# Patient Record
Sex: FEMALE | Race: BLACK OR AFRICAN AMERICAN | URBAN - METROPOLITAN AREA
[De-identification: names, ages, dates, MRNs, and addresses within clinical notes are randomized per-mention and may not be internally consistent; named-entity substitution may affect disease eponyms.]

---

## 2017-05-17 ENCOUNTER — OFFICE VISIT (OUTPATIENT)
Dept: FAMILY MEDICINE CLINIC | Age: 32
End: 2017-05-17

## 2017-05-17 VITALS
HEIGHT: 62 IN | TEMPERATURE: 96.9 F | HEART RATE: 68 BPM | SYSTOLIC BLOOD PRESSURE: 126 MMHG | WEIGHT: 241 LBS | BODY MASS INDEX: 44.35 KG/M2 | RESPIRATION RATE: 18 BRPM | OXYGEN SATURATION: 100 % | DIASTOLIC BLOOD PRESSURE: 63 MMHG

## 2017-05-17 DIAGNOSIS — M76.61 ACHILLES TENDINITIS OF BOTH LOWER EXTREMITIES: Primary | ICD-10-CM

## 2017-05-17 DIAGNOSIS — L84 CALLUS OF FOOT: ICD-10-CM

## 2017-05-17 DIAGNOSIS — M76.62 ACHILLES TENDINITIS OF BOTH LOWER EXTREMITIES: Primary | ICD-10-CM

## 2017-05-17 RX ORDER — NAPROXEN 500 MG/1
500 TABLET ORAL 2 TIMES DAILY
Qty: 60 TAB | Refills: 0 | Status: SHIPPED | OUTPATIENT
Start: 2017-05-17 | End: 2017-06-13 | Stop reason: SDUPTHER

## 2017-05-17 RX ORDER — ARIPIPRAZOLE 20 MG/1
20 TABLET ORAL DAILY
Qty: 30 TAB | Refills: 0 | Status: CANCELLED | OUTPATIENT
Start: 2017-05-17

## 2017-05-17 RX ORDER — SERTRALINE HYDROCHLORIDE 100 MG/1
100 TABLET, FILM COATED ORAL DAILY
Qty: 30 TAB | Refills: 0 | Status: CANCELLED | OUTPATIENT
Start: 2017-05-17

## 2017-05-17 NOTE — PROGRESS NOTES
1. Have you been to the ER, urgent care clinic since your last visit? Hospitalized since your last visit? No    2. Have you seen or consulted any other health care providers outside of the [address] [place] [address] since your last visit? Include any pap smears or colon screening.  No

## 2017-05-17 NOTE — PROGRESS NOTES
Chief Complaint   Patient presents with    Foot Pain     some pain under the bottom of both feet as well. Pt is a 33y.o. year old female who presents for an acute visit for the above    Bilateral ankle pain-for a while now but recently worse; worse with walking, felt a knot on tendon on the right-gone now    Hurts on the lateral area-hard area    Has not taken any meds for the above sxs    Sees Psych in June-gets her rxs from them    ROS:    Pt denies: Wt loss, Fever/Chills, HA, Visual changes, Fatigue, Chest pain, SOB, TELLEZ, Abd pain, N/V/D/C, Blood in stool or urine, Edema. Pertinent positive as above in HPI. All others were negative    Patient Active Problem List   Diagnosis Code    Depression F32.9    Morbid obesity ([address] E66.01    Schizoaffective disorder ([facility] [address] F25.9       Past Medical History:   Diagnosis Date    Contact dermatitis and other eczema, due to unspecified cause     Depression     Headache     Lymphadenopathy 05-    Psychotic disorder        Current Outpatient Prescriptions   Medication Sig Dispense Refill    naproxen (NAPROSYN) 500 mg tablet Take 1 Tab by mouth two (2) times a day. For foot/ankle pain 60 Tab 0    ARIPiprazole (ABILIFY) 20 mg tablet Take 1 Tab by mouth daily. 30 Tab 0    sertraline (ZOLOFT) 100 mg tablet Take 1 Tab by mouth daily. 30 Tab 0    multivitamin (ONE A DAY) tablet Take 1 Tab by mouth daily.  traZODone (DESYREL) 100 mg tablet Take 1 Tab by mouth nightly.  30 Tab 1    clotrimazole-betamethasone (LOTRISONE) topical cream Apply thinlayer on affected areas twice a day 15 g 0       History   Smoking Status    Never Smoker   Smokeless Tobacco    Never Used       No Known Allergies    Patient Labs were reviewed: yes      Patient Past Records were reviewed:  yes        Objective:     Vitals:    05/17/17 1015   BP: 126/63   Pulse: 68   Resp: 18   Temp: 96.9 °F (36.1 °C)   TempSrc: Oral   SpO2: 100%   Weight: 241 lb (109.3 kg)   Height: 5' 2\" (1.575 m)     Body mass index is 44.08 kg/(m^2). Exam:   Appearance: alert, well appearing,  oriented to person, place, and time, acyanotic, in no respiratory distress and well hydrated. HEENT:  NC/AT, pink conj, anicteric sclerae  Neck:  No cervical lymphadenopathy, no JVD, no thyromegaly, no carotid bruit  Heart:  RRR without M/R/G  Lungs:  CTAB, no rhonchi, rales, or wheezes with good air exchange   Ext:  No C/C/E, reproducible pain on both Achilles tendon area, calluses noted on the lateral area of both feet   Skin: no rash  Neuro: no lateralizing signs, CNs II-XII intact      Assessment/ Plan:   Larisa Nash was seen today for foot pain. Diagnoses and all orders for this visit:    Achilles tendinitis of both lower extremities  -     naproxen (NAPROSYN) 500 mg tablet; Take 1 Tab by mouth two (2) times a day. For foot/ankle pain    Callus of foot  -     REFERRAL TO PODIATRY      Follow-up Disposition:  Return for previous appt, fasting labs. I have discussed the diagnosis with the patient and the intended plan as seen in the above orders. The patient has received an After-Visit Summary and questions were answered concerning future plans. Medication Side Effects and Warnings were discussed with patient: yes    Patient verbalized understanding of above instructions.     Marian Leos MD  Internal Medicine  [facility]

## 2017-05-17 NOTE — PATIENT INSTRUCTIONS
Achilles Tendon: Exercises  Your Care Instructions  Here are some examples of exercises for your achilles tendon. Start each exercise slowly. Ease off the exercise if you start to have pain. Your doctor or physical therapist will tell you when you can start these exercises and which ones will work best for you. How to do the exercises  Toe stretch    1. Sit in a chair, and extend your affected leg so that your heel is on the floor. 2. With your hand, reach down and pull your big toe up and back. Pull toward your ankle and away from the floor. 3. Hold the position for at least 15 to 30 seconds. 4. Repeat 2 to 4 times a session, several times a day. Calf-plantar fascia stretch    1. Sit with your legs extended and knees straight. 2. Place a towel around your foot just under the toes. 3. Hold each end of the towel in each hand, with your hands above your knees. 4. Pull back with the towel so that your foot stretches toward you. 5. Hold the position for at least 15 to 30 seconds. 6. Repeat 2 to 4 times a session, up to 5 sessions a day. Floor stretch    1. Stand about 2 feet from a wall, and place your hands on the wall at about shoulder height. Or you can stand behind a chair, placing your hands on the back of it for balance. 2. Step back with the leg you want to stretch. Keep the leg straight, and press your heel into the floor with your toe turned slightly in.  3. Lean forward, and bend your other leg slightly. Feel the stretch in the Achilles tendon of your back leg. Hold for at least 15 to 30 seconds. 4. Repeat 2 to 4 times a session, up to 5 sessions a day. Stair stretch    1. Stand with the balls of both feet on the edge of a step or curb (or a medium-sized phone book). With at least one hand, hold onto something solid for balance, such as a banister or handrail.   2. Keeping your affected leg straight, slowly let that heel hang down off of the step or curb until you feel a stretch in the back of your calf and/or Achilles area. Some of your weight should still be on the other leg. 3. Hold this position for at least 15 to 30 seconds. 4. Repeat 2 to 4 times a session, up to 5 times a day or whenever your Achilles tendon starts to feel tight. This stretch can also be done with your knee slightly bent. Strength exercise    This exercise will get you started on building strength after an Achilles tendon injury. Your doctor or physical therapist can help you move on to more challenging exercises as you heal and get stronger. 1. Stand on a step with your heel off the edge of the step. Hold on to a handrail or wall for balance. 2. Push up on your toes, then slowly count to 10 as you lower yourself back down until your heel is below the step. If it hurts to push up on your toes, try putting most of your weight on your other foot as you push up, or try using your arms to help you. If you can't do this exercise without causing pain, stop the exercise and talk to your doctor. 3. Repeat the exercise 8 to 12 times, half with the knee straight and half with the knee bent. Follow-up care is a key part of your treatment and safety. Be sure to make and go to all appointments, and call your doctor if you are having problems. It's also a good idea to know your test results and keep a list of the medicines you take. Where can you learn more? Go to http://genevieve-jayro.info/. Enter L351 in the search box to learn more about \"Achilles Tendon: Exercises. \"  Current as of: May 23, 2016  Content Version: 11.2  © 9720-4113 [employer]. Care instructions adapted under license by [employer] (which disclaims liability or warranty for this information). If you have questions about a medical condition or this instruction, always ask your healthcare professional. [address] any warranty or liability for your use of this information.

## 2017-05-17 NOTE — MR AVS SNAPSHOT
Visit Information Date & Time Provider Department Dept. Phone Encounter #  
 5/17/2017 10:00 AM Sharla Fountain MD [facility] [address] [id number] Follow-up Instructions Return for previous appt, fasting labs. Your Appointments 5/23/2017  2:15 PM  
Follow Up with MD Jake Self [facility]) Appt Note: 1 week follow up [facility] [place]  
  
   
 [address] [place]  
  
    
 6/21/2017 10:30 AM  
Office Visit with MD [facility] Wilhelm [facility] Appt Note: mwv  
 [phone] [place]  
  
   
 [address] [facility] Upcoming Health Maintenance Date Due INFLUENZA AGE 9 TO ADULT 8/1/2017 PAP AKA CERVICAL CYTOLOGY 12/23/2018 DTaP/Tdap/Td series (2 - Td) 12/14/2026 Allergies as of 5/17/2017  Review Complete On: 5/17/2017 By: Sharla Fountain MD  
 No Known Allergies Current Immunizations  Reviewed on 12/14/2016 Name Date Influenza Vaccine 10/29/2014, 10/14/2013 Influenza Vaccine Estanislado Gastelum) 12/22/2015 Influenza Vaccine (Quad) PF 10/14/2016 Influenza Vaccine Split 10/22/2012 Tdap 12/14/2016 10:00 AM  
  
 Not reviewed this visit You Were Diagnosed With   
  
 Codes Comments Achilles tendinitis of both lower extremities    -  Primary ICD-10-CM: M76.61, M76.62 
ICD-9-CM: 726.71 Callus of foot     ICD-10-CM: L84 
ICD-9-CM: 415 Vitals BP Pulse Temp Resp Height(growth percentile) Weight(growth percentile) 126/63 68 96.9 °F (36.1 °C) (Oral) 18 5' 2\" (1.575 m) 241 lb (109.3 kg) LMP SpO2 BMI OB Status Smoking Status 05/12/2017 100% 44.08 kg/m2 Having regular periods Never Smoker BMI and BSA Data Body Mass Index Body Surface Area  44.08 kg/m 2 2.19 m 2  
  
  
 Preferred Pharmacy Pharmacy Name Phone [address], [place] Your Updated Medication List  
  
   
This list is accurate as of: 5/17/17 10:47 AM.  Always use your most recent med list.  
  
  
  
  
 ARIPiprazole 20 mg tablet Commonly known as:  ABILIFY Take 1 Tab by mouth daily. clotrimazole-betamethasone topical cream  
Commonly known as:  Chai Doctor Apply thinlayer on affected areas twice a day  
  
 multivitamin tablet Commonly known as:  ONE A DAY Take 1 Tab by mouth daily. naproxen 500 mg tablet Commonly known as:  NAPROSYN Take 1 Tab by mouth two (2) times a day. For foot/ankle pain  
  
 sertraline 100 mg tablet Commonly known as:  ZOLOFT Take 1 Tab by mouth daily. traZODone 100 mg tablet Commonly known as:  Merleen Cables Take 1 Tab by mouth nightly. Prescriptions Sent to Pharmacy Refills  
 naproxen (NAPROSYN) 500 mg tablet 0 Sig: Take 1 Tab by mouth two (2) times a day. For foot/ankle pain  
 Class: Normal  
 Pharmacy: [address], [address] [facility] #: [phone] Route: Oral  
  
We Performed the Following REFERRAL TO PODIATRY [REF90 Custom] Comments:  
 Please evaluate patient for painful calluses on both feet, right worse than left Follow-up Instructions Return for previous appt, fasting labs. Referral Information Referral ID Referred By Referred To  
  
 [id number] Dearmaicol Conley Not Available Visits Status Start Date End Date 1 New Request 5/17/17 5/17/18 If your referral has a status of pending review or denied, additional information will be sent to support the outcome of this decision. Patient Instructions Achilles Tendon: Exercises Your Care Instructions Here are some examples of exercises for your achilles tendon.  Start each exercise slowly. Ease off the exercise if you start to have pain. Your doctor or physical therapist will tell you when you can start these exercises and which ones will work best for you. How to do the exercises Toe stretch 1. Sit in a chair, and extend your affected leg so that your heel is on the floor. 2. With your hand, reach down and pull your big toe up and back. Pull toward your ankle and away from the floor. 3. Hold the position for at least 15 to 30 seconds. 4. Repeat 2 to 4 times a session, several times a day. Calf-plantar fascia stretch 1. Sit with your legs extended and knees straight. 2. Place a towel around your foot just under the toes. 3. Hold each end of the towel in each hand, with your hands above your knees. 4. Pull back with the towel so that your foot stretches toward you. 5. Hold the position for at least 15 to 30 seconds. 6. Repeat 2 to 4 times a session, up to 5 sessions a day. Floor stretch 1. Stand about 2 feet from a wall, and place your hands on the wall at about shoulder height. Or you can stand behind a chair, placing your hands on the back of it for balance. 2. Step back with the leg you want to stretch. Keep the leg straight, and press your heel into the floor with your toe turned slightly in. 
3. Lean forward, and bend your other leg slightly. Feel the stretch in the Achilles tendon of your back leg. Hold for at least 15 to 30 seconds. 4. Repeat 2 to 4 times a session, up to 5 sessions a day. Stair stretch 1. Stand with the balls of both feet on the edge of a step or curb (or a medium-sized phone book). With at least one hand, hold onto something solid for balance, such as a banister or handrail. 2. Keeping your affected leg straight, slowly let that heel hang down off of the step or curb until you feel a stretch in the back of your calf and/or Achilles area. Some of your weight should still be on the other leg. 3. Hold this position for at least 15 to 30 seconds. 4. Repeat 2 to 4 times a session, up to 5 times a day or whenever your Achilles tendon starts to feel tight. This stretch can also be done with your knee slightly bent. Strength exercise This exercise will get you started on building strength after an Achilles tendon injury. Your doctor or physical therapist can help you move on to more challenging exercises as you heal and get stronger. 1. Stand on a step with your heel off the edge of the step. Hold on to a handrail or wall for balance. 2. Push up on your toes, then slowly count to 10 as you lower yourself back down until your heel is below the step. If it hurts to push up on your toes, try putting most of your weight on your other foot as you push up, or try using your arms to help you. If you can't do this exercise without causing pain, stop the exercise and talk to your doctor. 3. Repeat the exercise 8 to 12 times, half with the knee straight and half with the knee bent. Follow-up care is a key part of your treatment and safety. Be sure to make and go to all appointments, and call your doctor if you are having problems. It's also a good idea to know your test results and keep a list of the medicines you take. Where can you learn more? Go to http://genevieve-jayro.info/. Enter E286 in the search box to learn more about \"Achilles Tendon: Exercises. \" Current as of: May 23, 2016 Content Version: 11.2 © 1873-0834 [employer]. Care instructions adapted under license by [employer] (which disclaims liability or warranty for this information). If you have questions about a medical condition or this instruction, always ask your healthcare professional. [place] [address] any warranty or liability for your use of this information. Introducing \[facility]\"" & HEALTH SERVICES! Dear Nile Corbett: Thank you for requesting a LiveAction account. Our records indicate that you have previously registered for a LiveAction account but its currently inactive. Please call our LiveAction support line at [phone]. Additional Information If you have questions, please visit the Frequently Asked Questions section of the LiveAction website at [email or web address] Remember, LiveAction is NOT to be used for urgent needs. For medical emergencies, dial 911. Now available from your iPhone and Android! Please provide this summary of care documentation to your next provider. Your primary care clinician is listed as Iva Flores. If you have any questions after today's visit, please call [phone].

## 2017-06-13 DIAGNOSIS — M76.61 ACHILLES TENDINITIS OF BOTH LOWER EXTREMITIES: ICD-10-CM

## 2017-06-13 DIAGNOSIS — M76.62 ACHILLES TENDINITIS OF BOTH LOWER EXTREMITIES: ICD-10-CM

## 2017-06-14 RX ORDER — NAPROXEN 500 MG/1
500 TABLET ORAL 2 TIMES DAILY
Qty: 60 TAB | Refills: 0 | Status: SHIPPED | OUTPATIENT
Start: 2017-06-14 | End: 2017-07-11 | Stop reason: SDUPTHER

## 2017-06-21 ENCOUNTER — OFFICE VISIT (OUTPATIENT)
Dept: FAMILY MEDICINE CLINIC | Age: 32
End: 2017-06-21

## 2017-06-21 VITALS
BODY MASS INDEX: 44.53 KG/M2 | SYSTOLIC BLOOD PRESSURE: 131 MMHG | RESPIRATION RATE: 16 BRPM | DIASTOLIC BLOOD PRESSURE: 61 MMHG | TEMPERATURE: 97.2 F | HEIGHT: 62 IN | WEIGHT: 242 LBS | HEART RATE: 75 BPM

## 2017-06-21 DIAGNOSIS — Z13.39 SCREENING FOR ALCOHOLISM: ICD-10-CM

## 2017-06-21 DIAGNOSIS — Z00.00 ROUTINE GENERAL MEDICAL EXAMINATION AT A HEALTH CARE FACILITY: Primary | ICD-10-CM

## 2017-06-21 DIAGNOSIS — Z02.89 ENCOUNTER FOR COMPLETION OF FORM WITH PATIENT: ICD-10-CM

## 2017-06-21 DIAGNOSIS — Z11.1 SCREENING FOR TUBERCULOSIS: ICD-10-CM

## 2017-06-21 NOTE — PROGRESS NOTES
1. Have you been to the ER, urgent care clinic since your last visit? Hospitalized since your last visit? No.     2. Have you seen or consulted any other health care providers outside of the [address] [place] [address] since your last visit? Include any pap smears or colon screening. No.     Patient presents for Medicare Wellness Visit.

## 2017-06-21 NOTE — PROGRESS NOTES
Chief Complaint   Patient presents with    Annual Wellness Visit       This is a Subsequent Medicare Annual Wellness Visit providing Personalized Prevention Plan Services (PPPS) (Performed 12 months after initial AWV and PPPS )    I have reviewed the patient's medical history in detail and updated the computerized patient record. History     Past Medical History:   Diagnosis Date    Contact dermatitis and other eczema, due to unspecified cause     Depression     Headache     Lymphadenopathy 05-    Psychotic disorder       History reviewed. No pertinent surgical history. Current Outpatient Prescriptions   Medication Sig Dispense Refill    naproxen (NAPROSYN) 500 mg tablet Take 1 Tab by mouth two (2) times a day. For foot/ankle pain 60 Tab 0    ARIPiprazole (ABILIFY) 20 mg tablet Take 1 Tab by mouth daily. 30 Tab 0    sertraline (ZOLOFT) 100 mg tablet Take 1 Tab by mouth daily. 30 Tab 0    clotrimazole-betamethasone (LOTRISONE) topical cream Apply thinlayer on affected areas twice a day 15 g 0    multivitamin (ONE A DAY) tablet Take 1 Tab by mouth daily.  traZODone (DESYREL) 100 mg tablet Take 1 Tab by mouth nightly. 27 Tab 1     No Known Allergies  Family History   Problem Relation Age of Onset    Cancer Mother      Nonhodgkins lymphoma    Diabetes Father     Hypertension Father      Social History   Substance Use Topics    Smoking status: Never Smoker    Smokeless tobacco: Never Used    Alcohol use No     Patient Active Problem List   Diagnosis Code    Depression F32.9    Morbid obesity ([facility] [address] E66.01    Schizoaffective disorder ([facility] [address] F25.9       Depression Risk Factor Screening:   No flowsheet data found. Alcohol Risk Factor Screening: On any occasion during the past 3 months, have you had more than 3 drinks containing alcohol? No    Do you average more than 7 drinks per week?   No      Functional Ability and Level of Safety:     Hearing Loss   none    Activities of Daily Living   Self-care. Requires assistance with: no ADLs    Fall Risk   No flowsheet data found. Abuse Screen   Patient is not abused    Review of Systems   A comprehensive review of systems was negative except for that written in the HPI. Physical Examination     Evaluation of Cognitive Function:  Mood/affect:  happy  Appearance: age appropriate  Family member/caregiver input: 0      Visit Vitals    /61    Pulse 75    Temp 97.2 °F (36.2 °C) (Oral)    Resp 16    Ht 5' 2\" (1.575 m)    Wt 242 lb (109.8 kg)    LMP 06/02/2017    BMI 44.26 kg/m2           Patient Care Team:  Guero Giordano MD as PCP - General (Internal Medicine)  Zora Das, RN as Ambulatory Care Navigator  Dr Kumar Lynn - [facility] [place] [address]        Advice/Referrals/Counseling   Education and counseling provided:  Are appropriate based on today's review and evaluation      Assessment/Plan         ICD-10-CM ICD-9-CM    1. Routine general medical examination at a health care facility Z00.00 V70.0    2. Screening for alcoholism Z13.89 V79.1    3. Encounter for completion of form with patient Z02.89 V68.89 Biometric form completed and given to patient. 4. Screening for tuberculosis Z11.1 V74.1  patient advised to return next week for PPD              Additional Notes: Discussed today's diagnosis, treatment plans. Discussed medication indications and side effects. After Visit Summary: Provided and discussed printed patient instructions. Answered questions in relation to today's diagnosis.   Follow-up Disposition: Follow up as needed with your PCP               Sarai Uribe NP-BC  Family Medicine  [facility]            Orders Placed This Encounter    AMB POC TUBERCULOSIS, INTRADERMAL (SKIN TEST)

## 2017-06-21 NOTE — MR AVS SNAPSHOT
Visit Information Date & Time Provider Department Dept. Phone Encounter #  
 6/21/2017 10:30 AM Laney Shepherd NP [facility] [id number] Upcoming Health Maintenance Date Due INFLUENZA AGE 9 TO ADULT 8/1/2017 PAP AKA CERVICAL CYTOLOGY 12/23/2018 DTaP/Tdap/Td series (2 - Td) 12/14/2026 Allergies as of 6/21/2017  Review Complete On: 6/21/2017 By: Lizzie Toussaint No Known Allergies Current Immunizations  Reviewed on 12/14/2016 Name Date Influenza Vaccine 10/29/2014, 10/14/2013 Influenza Vaccine Lorean Dunk) 12/22/2015 Influenza Vaccine (Quad) PF 10/14/2016 Influenza Vaccine Split 10/22/2012 TB Skin Test (PPD) Intradermal  Incomplete Tdap 12/14/2016 10:00 AM  
  
 Not reviewed this visit You Were Diagnosed With   
  
 Codes Comments Routine general medical examination at a health care facility     ICD-10-CM: Z00.00 ICD-9-CM: V70.0 Screening for alcoholism     ICD-10-CM: Z13.89 ICD-9-CM: V79.1 Vitals BP Pulse Temp Resp Height(growth percentile) Weight(growth percentile) 131/61 75 97.2 °F (36.2 °C) (Oral) 16 5' 2\" (1.575 m) 242 lb (109.8 kg) LMP BMI OB Status Smoking Status 06/02/2017 44.26 kg/m2 Having regular periods Never Smoker Vitals History BMI and BSA Data Body Mass Index Body Surface Area  
 44.26 kg/m 2 2.19 m 2 Preferred Pharmacy Pharmacy Name Phone [address] Your Updated Medication List  
  
   
This list is accurate as of: 6/21/17 11:51 AM.  Always use your most recent med list.  
  
  
  
  
 ARIPiprazole 20 mg tablet Commonly known as:  ABILIFY Take 1 Tab by mouth daily. clotrimazole-betamethasone topical cream  
Commonly known as:  Bettyjane  Apply thinlayer on affected areas twice a day  
  
 multivitamin tablet Commonly known as:  ONE A DAY Take 1 Tab by mouth daily. naproxen 500 mg tablet Commonly known as:  NAPROSYN Take 1 Tab by mouth two (2) times a day. For foot/ankle pain  
  
 sertraline 100 mg tablet Commonly known as:  ZOLOFT Take 1 Tab by mouth daily. traZODone 100 mg tablet Commonly known as:  Brad Gold Take 1 Tab by mouth nightly. We Performed the Following AMB POC TUBERCULOSIS, INTRADERMAL (SKIN TEST) [98052 CPT(R)] Patient Instructions Medicare Wellness Visit, Female The best way to live healthy is to have a healthy lifestyle by eating a well-balanced diet, exercising regularly, limiting alcohol and stopping smoking. Regular physical exams and screening tests are another way to keep healthy. Preventive exams provided by your health care provider can find health problems before they become diseases or illnesses. Preventive services including immunizations, screening tests, monitoring and exams can help you take care of your own health. All people over age 72 should have a pneumovax  and and a prevnar shot to prevent pneumonia. These are once in a lifetime unless you and your provider decide differently. All people over 65 should have a yearly flu shot and a tetanus vaccine every 10 years. A bone mass density to screen for osteoporosis or thinning of the bones should be done every 2 years after 65. Screening for diabetes mellitus with a blood sugar test should be done every year. Glaucoma is a disease of the eye due to increased ocular pressure that can lead to blindness and it should be done every year by an eye professional. 
 
Cardiovascular screening tests that check for elevated lipids (fatty part of blood) which can lead to heart disease and strokes should be done every 5 years. Colorectal screening that evaluates for blood or polyps in your colon should be done yearly as a stool test or every five years as a flexible sigmoidoscope or every 10 years as a colonoscopy up to age 76. Breast cancer screening with a mammogram is recommended biennially  for women age 54-69. Screening for cervical cancer with a pap smear and pelvic exam is recommended for women after age 72 years every 2 years up to age 79 or when the provider and patient decide to stop. If there is a history of cervical abnormalities or other increased risk for cancer then the test is recommended yearly. Hepatitis C screening is also recommended for anyone born between [birth year] through [address]. A shingles vaccine is also recommended once in a lifetime after age 61. Your Medicare Wellness Exam is recommended annually. Here is a list of your current Health Maintenance items with a due date: There are no preventive care reminders to display for this patient. Introducing [facility]! Dear Prince Sierra: Thank you for requesting a [employer] account. Our records indicate that you have previously registered for a [employer] account but its currently inactive. Please call our [employer] support line at [phone]. Additional Information If you have questions, please visit the Frequently Asked Questions section of the [employer] website at [email or web address] Remember, [employer] is NOT to be used for urgent needs. For medical emergencies, dial 911. Now available from your iPhone and Android! Please provide this summary of care documentation to your next provider. Your primary care clinician is listed as Devyn Diaz. If you have any questions after today's visit, please call [phone].

## 2017-06-26 ENCOUNTER — OFFICE VISIT (OUTPATIENT)
Dept: FAMILY MEDICINE CLINIC | Age: 32
End: 2017-06-26

## 2017-06-26 VITALS
RESPIRATION RATE: 16 BRPM | BODY MASS INDEX: 44.16 KG/M2 | WEIGHT: 240 LBS | DIASTOLIC BLOOD PRESSURE: 62 MMHG | SYSTOLIC BLOOD PRESSURE: 98 MMHG | OXYGEN SATURATION: 100 % | HEIGHT: 62 IN | TEMPERATURE: 97.2 F | HEART RATE: 79 BPM

## 2017-06-26 DIAGNOSIS — E66.01 MORBID OBESITY, UNSPECIFIED OBESITY TYPE (HCC): ICD-10-CM

## 2017-06-26 DIAGNOSIS — Z11.1 SCREENING FOR TUBERCULOSIS: ICD-10-CM

## 2017-06-26 DIAGNOSIS — E78.2 MIXED HYPERLIPIDEMIA: Primary | ICD-10-CM

## 2017-06-26 NOTE — PROGRESS NOTES
1. Have you been to the ER, urgent care clinic since your last visit? Hospitalized since your last visit? No    2. Have you seen or consulted any other health care providers outside of the [address] [place] [address] since your last visit? Include any pap smears or colon screening. No      PPD placed to patients left forearm; Patient instructed to return to clinic for PPD reading on 6/28/2017.

## 2017-06-26 NOTE — PROGRESS NOTES
Chief Complaint   Patient presents with   Ean Foley PPD Placement        This is a 33 y.o female who presents today for PPD placement and  Labs. Patient also concerned about weight- she states she has cut down on eating fried food and sweets. Last labs done 12/22/15 shows levated lipids:    Component Results   Component Value Flag Ref Range Units Status   Cholesterol, total 216 (H) 100 - 199 mg/dL Final   Triglyceride 68  0 - 149 mg/dL Final   HDL Cholesterol 77  >39 mg/dL Final   Comment:   According to ATP-III Guidelines, HDL-C >59 mg/dL is considered a   negative risk factor for CHD. VLDL, calculated 14  5 - 40 mg/dL Final   LDL, calculated 125 (H) 0 - 99 mg/dL Final         History     Past Medical History:   Diagnosis Date    Contact dermatitis and other eczema, due to unspecified cause     Depression     Headache     Lymphadenopathy 05-    Psychotic disorder       No past surgical history on file. Current Outpatient Prescriptions   Medication Sig Dispense Refill    naproxen (NAPROSYN) 500 mg tablet Take 1 Tab by mouth two (2) times a day. For foot/ankle pain 60 Tab 0    ARIPiprazole (ABILIFY) 20 mg tablet Take 1 Tab by mouth daily. 30 Tab 0    sertraline (ZOLOFT) 100 mg tablet Take 1 Tab by mouth daily. 30 Tab 0    multivitamin (ONE A DAY) tablet Take 1 Tab by mouth daily.  traZODone (DESYREL) 100 mg tablet Take 1 Tab by mouth nightly.  30 Tab 1    clotrimazole-betamethasone (LOTRISONE) topical cream Apply thinlayer on affected areas twice a day 15 g 0     No Known Allergies  Family History   Problem Relation Age of Onset    Cancer Mother      Nonhodgkins lymphoma    Diabetes Father     Hypertension Father      Social History   Substance Use Topics    Smoking status: Never Smoker    Smokeless tobacco: Never Used    Alcohol use No     Patient Active Problem List   Diagnosis Code    Depression F32.9    Morbid obesity ([address] E66.01    Schizoaffective disorder ([address] F25.9       Review of Systems   A comprehensive review of systems was negative except for that written in the HPI. Physical Examination       Visit Vitals    BP 98/62    Pulse 79    Temp 97.2 °F (36.2 °C) (Oral)    Resp 16    Ht 5' 2\" (1.575 m)    Wt 240 lb (108.9 kg)    LMP 06/02/2017    SpO2 100%    BMI 43.9 kg/m2         Assessment/Plan       ICD-10-CM ICD-9-CM    1. Mixed hyperlipidemia E78.2 272.2 LIPID PANEL   2. Morbid obesity, unspecified obesity type E66.01 278.01 CBC WITH AUTOMATED DIFF      VITAMIN D, 25 HYDROXY      METABOLIC PANEL, COMPREHENSIVE      TSH 3RD GENERATION      T4, FREE      LIPID PANEL      HEMOGLOBIN A1C WITH EAG   3. Screening for tuberculosis Z11.1 V74.1 AMB POC TUBERCULOSIS, INTRADERMAL (SKIN TEST)              Additional Notes: Discussed today's diagnosis, treatment plans. Discussed medication indications and side effects. After Visit Summary: Provided and discussed printed patient instructions. Answered questions in relation to today's diagnosis.   Follow-up Disposition: Follow up as needed with your PCP               Shiloh Joiner NP-BC  Family Medicine  [facility]            Orders Placed This Encounter    CBC WITH AUTOMATED DIFF    VITAMIN D, 25 HYDROXY    METABOLIC PANEL, COMPREHENSIVE    TSH 3RD GENERATION    T4, FREE    LIPID PANEL    HEMOGLOBIN A1C WITH EAG    AMB POC TUBERCULOSIS, INTRADERMAL (SKIN TEST)

## 2017-06-26 NOTE — PATIENT INSTRUCTIONS

## 2017-06-26 NOTE — MR AVS SNAPSHOT
Visit Information Date & Time Provider Department Dept. Phone Encounter #  
 6/26/2017 11:00 AM Farzad Luu NP [facility] [id number] Follow-up Instructions Return if symptoms worsen or fail to improve. Your Appointments 6/21/2018 10:30 AM  
Office Visit with Farzad Luu NP  
Jake Flores (Beverley Delatorre) Appt Note: mwv  
 [address] [place]  
  
   
 [address] Upcoming Health Maintenance Date Due INFLUENZA AGE 9 TO ADULT 8/1/2017 PAP AKA CERVICAL CYTOLOGY 12/23/2018 DTaP/Tdap/Td series (2 - Td) 12/14/2026 Allergies as of 6/26/2017  Review Complete On: 6/21/2017 By: Aysha Tarango No Known Allergies Current Immunizations  Reviewed on 12/14/2016 Name Date Influenza Vaccine 10/29/2014, 10/14/2013 Influenza Vaccine Avanell Euler) 12/22/2015 Influenza Vaccine (Quad) PF 10/14/2016 Influenza Vaccine Split 10/22/2012 TB Skin Test (PPD) Intradermal  Incomplete Tdap 12/14/2016 10:00 AM  
  
 Not reviewed this visit You Were Diagnosed With   
  
 Codes Comments Mixed hyperlipidemia    -  Primary ICD-10-CM: X31.9 ICD-9-CM: 272.2 Morbid obesity, unspecified obesity type     ICD-10-CM: E66.01 
ICD-9-CM: 278.01 Screening for tuberculosis     ICD-10-CM: Z11.1 ICD-9-CM: V74.1 Vitals LMP OB Status Smoking Status 06/02/2017 Having regular periods Never Smoker Preferred Pharmacy Pharmacy Name Phone [facility] [address] [place] [address], [address] Your Updated Medication List  
  
   
This list is accurate as of: 6/26/17 11:19 AM.  Always use your most recent med list.  
  
  
  
  
 ARIPiprazole 20 mg tablet Commonly known as:  ABILIFY Take 1 Tab by mouth daily.   
  
 clotrimazole-betamethasone topical cream  
 Commonly known as:  [facility] Apply thinlayer on affected areas twice a day  
  
 multivitamin tablet Commonly known as:  ONE A DAY Take 1 Tab by mouth daily. naproxen 500 mg tablet Commonly known as:  NAPROSYN Take 1 Tab by mouth two (2) times a day. For foot/ankle pain  
  
 sertraline 100 mg tablet Commonly known as:  ZOLOFT Take 1 Tab by mouth daily. traZODone 100 mg tablet Commonly known as:  Blondell Poet Take 1 Tab by mouth nightly. We Performed the Following AMB POC TUBERCULOSIS, INTRADERMAL (SKIN TEST) [70867 CPT(R)] Follow-up Instructions Return if symptoms worsen or fail to improve. To-Do List   
 06/26/2017 Lab:  CBC WITH AUTOMATED DIFF   
  
 06/26/2017 Lab:  HEMOGLOBIN A1C WITH EAG   
  
 06/26/2017 Lab:  LIPID PANEL   
  
 06/26/2017 Lab:  METABOLIC PANEL, COMPREHENSIVE   
  
 06/26/2017 Lab:  T4, FREE   
  
 06/26/2017 Lab:  TSH 3RD GENERATION   
  
 06/26/2017 Lab:  VITAMIN D, 25 HYDROXY Patient Instructions Starting a Weight Loss Plan: Care Instructions Your Care Instructions If you are thinking about losing weight, it can be hard to know where to start. Your doctor can help you set up a weight loss plan that best meets your needs. You may want to take a class on nutrition or exercise, or join a weight loss support group. If you have questions about how to make changes to your eating or exercise habits, ask your doctor about seeing a registered dietitian or an exercise specialist. 
It can be a big challenge to lose weight. But you do not have to make huge changes at once. Make small changes, and stick with them. When those changes become habit, add a few more changes. If you do not think you are ready to make changes right now, try to pick a date in the future. Make an appointment to see your doctor to discuss whether the time is right for you to start a plan. Follow-up care is a key part of your treatment and safety. Be sure to make and go to all appointments, and call your doctor if you are having problems. Its also a good idea to know your test results and keep a list of the medicines you take. How can you care for yourself at home? · Set realistic goals. Many people expect to lose much more weight than is likely. A weight loss of 5% to 10% of your body weight may be enough to improve your health. · Get family and friends involved to provide support. Talk to them about why you are trying to lose weight, and ask them to help. They can help by participating in exercise and having meals with you, even if they may be eating something different. · Find what works best for you. If you do not have time or do not like to cook, a program that offers meal replacement bars or shakes may be better for you. Or if you like to prepare meals, finding a plan that includes daily menus and recipes may be best. 
· Ask your doctor about other health professionals who can help you achieve your weight loss goals. ¨ A dietitian can help you make healthy changes in your diet. ¨ An exercise specialist or  can help you develop a safe and effective exercise program. 
¨ A counselor or psychiatrist can help you cope with issues such as depression, anxiety, or family problems that can make it hard to focus on weight loss. · Consider joining a support group for people who are trying to lose weight. Your doctor can suggest groups in your area. Where can you learn more? Go to http://genevieve-jayro.info/. Enter P330 in the search box to learn more about \"Starting a Weight Loss Plan: Care Instructions. \" Current as of: October 13, 2016 Content Version: 11.3 © 6756-8812 [employer], Max Rumpus.  Care instructions adapted under license by [employer] [facility] [employer] (which disclaims liability or warranty for this information). If you have questions about a medical condition or this instruction, always ask your healthcare professional. [address] any warranty or liability for your use of this information. Introducing [facility]! Dear Deanne Knutson: Thank you for requesting a [employer] account. Our records indicate that you have previously registered for a [employer] account but its currently inactive. Please call our [employer] support line at [phone]. Additional Information If you have questions, please visit the Frequently Asked Questions section of the [employer] website at [email or web address] Remember, [employer] is NOT to be used for urgent needs. For medical emergencies, dial 911. Now available from your iPhone and Android! Please provide this summary of care documentation to your next provider. Your primary care clinician is listed as Jimbo Schaeffer. If you have any questions after today's visit, please call [phone].

## 2017-06-27 LAB
25(OH)D3+25(OH)D2 SERPL-MCNC: 26.7 NG/ML (ref 30–100)
ALBUMIN SERPL-MCNC: 4.2 G/DL (ref 3.5–5.5)
ALBUMIN/GLOB SERPL: 1.4 {RATIO} (ref 1.2–2.2)
ALP SERPL-CCNC: 43 IU/L (ref 39–117)
ALT SERPL-CCNC: 18 IU/L (ref 0–32)
AST SERPL-CCNC: 13 IU/L (ref 0–40)
BASOPHILS # BLD AUTO: 0 X10E3/UL (ref 0–0.2)
BASOPHILS NFR BLD AUTO: 0 %
BILIRUB SERPL-MCNC: 0.3 MG/DL (ref 0–1.2)
BUN SERPL-MCNC: 14 MG/DL (ref 6–20)
BUN/CREAT SERPL: 22 (ref 9–23)
CALCIUM SERPL-MCNC: 9.4 MG/DL (ref 8.7–10.2)
CHLORIDE SERPL-SCNC: 100 MMOL/L (ref 96–106)
CHOLEST SERPL-MCNC: 239 MG/DL (ref 100–199)
CO2 SERPL-SCNC: 23 MMOL/L (ref 18–29)
CREAT SERPL-MCNC: 0.64 MG/DL (ref 0.57–1)
EOSINOPHIL # BLD AUTO: 0.1 X10E3/UL (ref 0–0.4)
EOSINOPHIL NFR BLD AUTO: 1 %
ERYTHROCYTE [DISTWIDTH] IN BLOOD BY AUTOMATED COUNT: 14.4 % (ref 12.3–15.4)
EST. AVERAGE GLUCOSE BLD GHB EST-MCNC: 103 MG/DL
GLOBULIN SER CALC-MCNC: 2.9 G/DL (ref 1.5–4.5)
GLUCOSE SERPL-MCNC: 73 MG/DL (ref 65–99)
HBA1C MFR BLD: 5.2 % (ref 4.8–5.6)
HCT VFR BLD AUTO: 35.2 % (ref 34–46.6)
HDLC SERPL-MCNC: 84 MG/DL
HGB BLD-MCNC: 11.8 G/DL (ref 11.1–15.9)
IMM GRANULOCYTES # BLD: 0 X10E3/UL (ref 0–0.1)
IMM GRANULOCYTES NFR BLD: 0 %
INTERPRETATION, 910389: NORMAL
LDLC SERPL CALC-MCNC: 136 MG/DL (ref 0–99)
LYMPHOCYTES # BLD AUTO: 3.6 X10E3/UL (ref 0.7–3.1)
LYMPHOCYTES NFR BLD AUTO: 53 %
MCH RBC QN AUTO: 28.3 PG (ref 26.6–33)
MCHC RBC AUTO-ENTMCNC: 33.5 G/DL (ref 31.5–35.7)
MCV RBC AUTO: 84 FL (ref 79–97)
MONOCYTES # BLD AUTO: 0.4 X10E3/UL (ref 0.1–0.9)
MONOCYTES NFR BLD AUTO: 6 %
NEUTROPHILS # BLD AUTO: 2.7 X10E3/UL (ref 1.4–7)
NEUTROPHILS NFR BLD AUTO: 40 %
PLATELET # BLD AUTO: 247 X10E3/UL (ref 150–379)
POTASSIUM SERPL-SCNC: 4.6 MMOL/L (ref 3.5–5.2)
PROT SERPL-MCNC: 7.1 G/DL (ref 6–8.5)
RBC # BLD AUTO: 4.17 X10E6/UL (ref 3.77–5.28)
SODIUM SERPL-SCNC: 139 MMOL/L (ref 134–144)
T4 FREE SERPL-MCNC: 1.16 NG/DL (ref 0.82–1.77)
TRIGL SERPL-MCNC: 96 MG/DL (ref 0–149)
TSH SERPL DL<=0.005 MIU/L-ACNC: 1.4 UIU/ML (ref 0.45–4.5)
VLDLC SERPL CALC-MCNC: 19 MG/DL (ref 5–40)
WBC # BLD AUTO: 6.9 X10E3/UL (ref 3.4–10.8)

## 2017-06-28 ENCOUNTER — CLINICAL SUPPORT (OUTPATIENT)
Dept: FAMILY MEDICINE CLINIC | Age: 32
End: 2017-06-28

## 2017-06-28 DIAGNOSIS — Z11.1 ENCOUNTER FOR PPD SKIN TEST READING: Primary | ICD-10-CM

## 2017-06-28 NOTE — LETTER
6/28/2017 1:38 PM 
 
Ms. Avila [address] [facility] [address] To whom this may concern, 
 
Ms. Kennedy Gauthier has been under the care of [facility] and was seen on June 26, 2017 in which she had a PPD placed. She has returned to our office on June 28, 2017 for reading and has been resulted as negative. For further questions, please contact our office at [phone]. Sincerely, Corbin Feliz NP

## 2017-06-28 NOTE — PROGRESS NOTES
Patient presented for PPD reading, which was placed on 6/26/2017 and is negative. Patient provided letter for verification.

## 2017-07-05 NOTE — PROGRESS NOTES
Vit sli low- take 1000 units daily. T.choles and LDL elevated- manage with diet and exercise.  F/U with PCP in 6 months for repeat lipids

## 2017-07-11 DIAGNOSIS — M76.61 ACHILLES TENDINITIS OF BOTH LOWER EXTREMITIES: ICD-10-CM

## 2017-07-11 DIAGNOSIS — M76.62 ACHILLES TENDINITIS OF BOTH LOWER EXTREMITIES: ICD-10-CM

## 2017-07-11 RX ORDER — NAPROXEN 500 MG/1
500 TABLET ORAL 2 TIMES DAILY
Qty: 60 TAB | Refills: 0 | Status: SHIPPED | OUTPATIENT
Start: 2017-07-11 | End: 2019-06-11

## 2017-08-10 PROBLEM — F20.9 SCHIZOPHRENIA (HCC): Status: ACTIVE | Noted: 2017-08-10

## 2017-08-10 PROBLEM — Z80.7 FAMILY HISTORY OF NON-HODGKIN'S LYMPHOMA: Status: ACTIVE | Noted: 2017-08-10

## 2017-08-10 PROBLEM — E78.00 PURE HYPERCHOLESTEROLEMIA: Status: ACTIVE | Noted: 2017-08-10

## 2018-05-15 ENCOUNTER — OFFICE VISIT (OUTPATIENT)
Dept: FAMILY MEDICINE CLINIC | Age: 33
End: 2018-05-15

## 2018-05-15 ENCOUNTER — HOSPITAL ENCOUNTER (OUTPATIENT)
Dept: LAB | Age: 33
Discharge: HOME | End: 2018-05-15
Payer: MEDICARE

## 2018-05-15 VITALS
SYSTOLIC BLOOD PRESSURE: 123 MMHG | OXYGEN SATURATION: 99 % | HEIGHT: 62 IN | HEART RATE: 82 BPM | RESPIRATION RATE: 20 BRPM | TEMPERATURE: 96.2 F | WEIGHT: 250.6 LBS | DIASTOLIC BLOOD PRESSURE: 65 MMHG | BODY MASS INDEX: 46.12 KG/M2

## 2018-05-15 DIAGNOSIS — B35.6 TINEA CRURIS: ICD-10-CM

## 2018-05-15 DIAGNOSIS — Z00.00 MEDICARE ANNUAL WELLNESS VISIT, SUBSEQUENT: ICD-10-CM

## 2018-05-15 DIAGNOSIS — R73.9 HYPERGLYCEMIA: ICD-10-CM

## 2018-05-15 DIAGNOSIS — Z11.3 VENEREAL DISEASE SCREENING: ICD-10-CM

## 2018-05-15 DIAGNOSIS — E78.00 PURE HYPERCHOLESTEROLEMIA: ICD-10-CM

## 2018-05-15 DIAGNOSIS — Z13.31 SCREENING FOR DEPRESSION: ICD-10-CM

## 2018-05-15 DIAGNOSIS — L30.4 INTERTRIGO: ICD-10-CM

## 2018-05-15 DIAGNOSIS — Z13.39 SCREENING FOR ALCOHOLISM: ICD-10-CM

## 2018-05-15 DIAGNOSIS — E66.01 MORBID OBESITY (HCC): Primary | ICD-10-CM

## 2018-05-15 DIAGNOSIS — F20.9 SCHIZOPHRENIA, UNSPECIFIED TYPE (HCC): ICD-10-CM

## 2018-05-15 DIAGNOSIS — F33.9 RECURRENT DEPRESSION (HCC): ICD-10-CM

## 2018-05-15 LAB
ALBUMIN SERPL-MCNC: 3.7 G/DL (ref 3.4–5)
ALBUMIN/GLOB SERPL: 1.2 {RATIO} (ref 0.8–1.7)
ALP SERPL-CCNC: 50 U/L (ref 45–117)
ALT SERPL-CCNC: 34 U/L (ref 13–56)
ANION GAP SERPL CALC-SCNC: 8 MMOL/L (ref 3–18)
AST SERPL-CCNC: 19 U/L (ref 15–37)
BASOPHILS # BLD: 0 K/UL (ref 0–0.06)
BASOPHILS NFR BLD: 0 % (ref 0–2)
BILIRUB SERPL-MCNC: 0.2 MG/DL (ref 0.2–1)
BUN SERPL-MCNC: 10 MG/DL (ref 7–18)
BUN/CREAT SERPL: 13 (ref 12–20)
CALCIUM SERPL-MCNC: 8.9 MG/DL (ref 8.5–10.1)
CHLORIDE SERPL-SCNC: 103 MMOL/L (ref 100–108)
CHOLEST SERPL-MCNC: 201 MG/DL
CO2 SERPL-SCNC: 29 MMOL/L (ref 21–32)
CREAT SERPL-MCNC: 0.78 MG/DL (ref 0.6–1.3)
DIFFERENTIAL METHOD BLD: ABNORMAL
EOSINOPHIL # BLD: 0.1 K/UL (ref 0–0.4)
EOSINOPHIL NFR BLD: 1 % (ref 0–5)
ERYTHROCYTE [DISTWIDTH] IN BLOOD BY AUTOMATED COUNT: 13.4 % (ref 11.6–14.5)
EST. AVERAGE GLUCOSE BLD GHB EST-MCNC: NORMAL MG/DL
GLOBULIN SER CALC-MCNC: 3.2 G/DL (ref 2–4)
GLUCOSE SERPL-MCNC: 94 MG/DL (ref 74–99)
HBA1C MFR BLD: 4.9 % (ref 4.2–5.6)
HCT VFR BLD AUTO: 33.3 % (ref 35–45)
HDLC SERPL-MCNC: 75 MG/DL (ref 40–60)
HDLC SERPL: 2.7 {RATIO} (ref 0–5)
HGB BLD-MCNC: 11.6 G/DL (ref 12–16)
LDLC SERPL CALC-MCNC: 110.6 MG/DL (ref 0–100)
LIPID PROFILE,FLP: ABNORMAL
LYMPHOCYTES # BLD: 3.3 K/UL (ref 0.9–3.6)
LYMPHOCYTES NFR BLD: 53 % (ref 21–52)
MCH RBC QN AUTO: 28.4 PG (ref 24–34)
MCHC RBC AUTO-ENTMCNC: 34.8 G/DL (ref 31–37)
MCV RBC AUTO: 81.6 FL (ref 74–97)
MONOCYTES # BLD: 0.4 K/UL (ref 0.05–1.2)
MONOCYTES NFR BLD: 6 % (ref 3–10)
NEUTS SEG # BLD: 2.5 K/UL (ref 1.8–8)
NEUTS SEG NFR BLD: 40 % (ref 40–73)
PLATELET # BLD AUTO: 246 K/UL (ref 135–420)
PMV BLD AUTO: 11.5 FL (ref 9.2–11.8)
POTASSIUM SERPL-SCNC: 3.9 MMOL/L (ref 3.5–5.5)
PROT SERPL-MCNC: 6.9 G/DL (ref 6.4–8.2)
RBC # BLD AUTO: 4.08 M/UL (ref 4.2–5.3)
SODIUM SERPL-SCNC: 140 MMOL/L (ref 136–145)
TRIGL SERPL-MCNC: 77 MG/DL (ref ?–150)
TSH SERPL DL<=0.05 MIU/L-ACNC: 0.7 UIU/ML (ref 0.36–3.74)
VLDLC SERPL CALC-MCNC: 15.4 MG/DL
WBC # BLD AUTO: 6.3 K/UL (ref 4.6–13.2)

## 2018-05-15 PROCEDURE — 80061 LIPID PANEL: CPT | Performed by: INTERNAL MEDICINE

## 2018-05-15 PROCEDURE — 85025 COMPLETE CBC W/AUTO DIFF WBC: CPT | Performed by: INTERNAL MEDICINE

## 2018-05-15 PROCEDURE — 83036 HEMOGLOBIN GLYCOSYLATED A1C: CPT | Performed by: INTERNAL MEDICINE

## 2018-05-15 PROCEDURE — 80053 COMPREHEN METABOLIC PANEL: CPT | Performed by: INTERNAL MEDICINE

## 2018-05-15 PROCEDURE — 87389 HIV-1 AG W/HIV-1&-2 AB AG IA: CPT | Performed by: INTERNAL MEDICINE

## 2018-05-15 PROCEDURE — 84443 ASSAY THYROID STIM HORMONE: CPT | Performed by: INTERNAL MEDICINE

## 2018-05-15 PROCEDURE — 36415 COLL VENOUS BLD VENIPUNCTURE: CPT | Performed by: INTERNAL MEDICINE

## 2018-05-15 RX ORDER — KETOCONAZOLE 20 MG/G
CREAM TOPICAL 2 TIMES DAILY
Qty: 30 G | Refills: 1 | Status: SHIPPED | OUTPATIENT
Start: 2018-05-15 | End: 2018-12-11

## 2018-05-15 RX ORDER — NYSTATIN 100000 [USP'U]/G
POWDER TOPICAL
Qty: 60 G | Refills: 2 | Status: SHIPPED | OUTPATIENT
Start: 2018-05-15 | End: 2019-06-11

## 2018-05-15 NOTE — ASSESSMENT & PLAN NOTE
This condition is managed by Specialist.  Key Psychotherapeutic Meds             ARIPiprazole (ABILIFY) 20 mg tablet  (Taking) Take 1 Tab by mouth daily. sertraline (ZOLOFT) 100 mg tablet  (Taking) Take 1 Tab by mouth daily. traZODone (DESYREL) 100 mg tablet  (Taking) Take 1 Tab by mouth nightly. Other [address] [place] [address]     The patient is on no other behavioral health meds.         Lab Results   Component Value Date/Time    Sodium 139 06/26/2017 11:34 AM    Creatinine 0.64 06/26/2017 11:34 AM    TSH 1.400 06/26/2017 11:34 AM    WBC 6.9 06/26/2017 11:34 AM    ALT (SGPT) 18 06/26/2017 11:34 AM    AST (SGOT) 13 06/26/2017 11:34 AM

## 2018-05-15 NOTE — PROGRESS NOTES
1. Have you been to the ER, urgent care clinic since your last visit? Hospitalized since your last visit? No    2. Have you seen or consulted any other health care providers outside of the [facility] since your last visit? Include any pap smears or colon screening.  No

## 2018-05-15 NOTE — PROGRESS NOTES
ANNUAL PHYSICAL EXAMINATION    History of Present Illness  Christopher Duarte is a 27 y.o. female who presents today for management of    Chief Complaint   Patient presents with   [facility] Annual Wellness Visit       Patient here for routine exam.  Current Complaints: rash on the vulvar area. Rash  Patient complains of rash involving the genitalia. Rash started 2 months ago. Appearance of rash at onset: Color of lesion(s): skin-colored, Size of lesion(s): tiny. Rash has changed over time Initial distribution: genitalia. Discomfort associated with rash: painful. Associated symptoms: no associated symptoms. Denies: fever, abdominal pain, irritability. Patient has not had previous evaluation of rash. Patient has had no previous treatment. Response to treatment: n/a. Patient has not had contacts with similar rash. Patient has not identified precipitant. Patient has not had new exposures (soaps, lotions, laundry detergents, foods, medications, plants, insects or animals.)       Gynecologic History  Patient's last menstrual period was Patient's last menstrual period was 2018. Sarah Trejo Contraception: none  Last Pap: 2015  Results: normal    Obstetric History  : 0  Para: 0  AB: 0    Health Maintenance  Colon cancer: due at age 46  Dyslipidemia: due  Diabetes mellitus: due  Influenza vaccine: due in the fall  Pneumococcal vaccine: not indicated  Tdap: uptodate  Herpes Zoster vaccine: due at age 59  Hep B vaccine: not indicated    Weight:  Body mass index is Estimated body mass index is Body mass index is 45.84 kg/(m^2). Sarah Trejo Discussed the patient's BMI with her.  The BMI follow up plan is as follows: Improve diet and 30 min of moderate activity at least 5 times a week.   Diet: no  Exercise: no  Seatbelt: yes  Sunscreen: no  Dentist: yes      Past Medical History  Past Medical History:   Diagnosis Date    Contact dermatitis and other eczema, due to unspecified cause     Depression     Headache(784.0)     Lymphadenopathy 05-    Psychotic disorder     Pure hypercholesterolemia 8/10/2017    Schizophrenia [facility])         Surgical History  History reviewed. No pertinent surgical history. Current Medications  Current Outpatient Prescriptions   Medication Sig    ketoconazole (NIZORAL) 2 % topical cream Apply  to affected area two (2) times a day.  nystatin (MYCOSTATIN) powder Apply to rash 4 times a day    naproxen (NAPROSYN) 500 mg tablet Take 1 Tab by mouth two (2) times a day. For foot/ankle pain    ARIPiprazole (ABILIFY) 20 mg tablet Take 1 Tab by mouth daily.  sertraline (ZOLOFT) 100 mg tablet Take 1 Tab by mouth daily.  multivitamin (ONE A DAY) tablet Take 1 Tab by mouth daily.  traZODone (DESYREL) 100 mg tablet Take 1 Tab by mouth nightly. No current facility-administered medications for this visit. Allergies/Drug Reactions  No Known Allergies     Family History  Family History   Problem Relation Age of Onset    Cancer Mother      Nonhodgkins lymphoma    Diabetes Father     Hypertension Father         Social History  Social History     Social History    Marital status: SINGLE     Spouse name: N/A    Number of children: N/A    Years of education: N/A     Occupational History    Not on file.      Social History Main Topics    Smoking status: Never Smoker    Smokeless tobacco: Never Used    Alcohol use No    Drug use: No    Sexual activity: No     Other Topics Concern    Not on file     Social History Narrative       Health Maintenance   Topic Date Due    MEDICARE YEARLY EXAM  06/22/2018    Influenza Age 9 to Adult  08/01/2018    PAP AKA CERVICAL CYTOLOGY  12/23/2018    DTaP/Tdap/Td series (2 - Td) 12/14/2026     Immunization History   Administered Date(s) Administered    Influenza Vaccine 10/14/2013, 10/29/2014    Influenza Vaccine (Quad) 12/22/2015    Influenza Vaccine (Quad) PF 10/14/2016    Influenza Vaccine Split 10/22/2012    TB Skin Test (PPD) Intradermal 06/26/2017    Tdap 12/14/2016       Review of Systems  General ROS: negative for - chills, fatigue or fever  Psychological ROS: negative for - anxiety, disorientation, sleep disturbances or suicidal ideation  Ophthalmic ROS: negative  ENT ROS: negative  Allergy and Immunology ROS: negative  Hematological and Lymphatic ROS: negative  Endocrine ROS: negative  Breast ROS: negative for breast lumps  Respiratory ROS: no cough, shortness of breath, or wheezing  Cardiovascular ROS: no chest pain or dyspnea on exertion  Gastrointestinal ROS: no abdominal pain, change in bowel habits, or black or bloody stools  Genito-Urinary ROS: no dysuria, trouble voiding, or hematuria  Musculoskeletal ROS: negative  Neurological ROS: negative  Dermatological ROS: positive for - rash       Hearing Screening    125Hz 250Hz 500Hz 1000Hz 2000Hz 3000Hz 4000Hz 6000Hz 8000Hz   Right ear:   Pass Pass Pass  Pass     Left ear:   Pass Pass Pass  Pass        Visual Acuity Screening    Right eye Left eye Both eyes   Without correction: 20/25 20/30 20/15   With correction:            Physical Exam  Vital signs:   Vitals:    05/15/18 0932   BP: 123/65   Pulse: 82   Resp: 20   Temp: 96.2 °F (35.7 °C)   TempSrc: Oral   SpO2: 99%   Weight: 250 lb 9.6 oz (113.7 kg)   Height: 5' 2\" (1.575 m)       General: alert, oriented, not in distress  Head: scalp normal, atraumatic  Eyes: pupils are equal and reactive, full and intact EOM's  Ears: patent ear canal, intact tympanic membrane  Nose: normal turbinates, no congestion or discharge  Lips/Mouth: moist lips and buccal mucosa, non-enlarged tonsils, pink throat  Neck: supple, no JVD, no lymphadenopathy, non-palpable thyroid  Chest/Lungs: clear breath sounds, no wheezing or crackles  Heart: normal rate, regular rhythm, no murmur  Abdomen: soft, non-distended, non-tender, normal bowel sounds, no organomegaly, no masses  Extremities: no focal deformities, no edema  Skin: hyperpigmented flat patches on the inframammary areas, (+) scaly, hypopigmented patches on both inguinal areas    Laboratory/Tests:  Routine labs ordered    Assessment/Plan:      ICD-10-CM ICD-9-CM    1. [facility] ([facility] E66.01 278.01    2. Medicare annual wellness visit, subsequent Z00.00 V70.0 CBC WITH AUTOMATED DIFF   3. Screening for alcoholism Z13.89 V79.1 MD ANNUAL ALCOHOL SCREEN 15 MIN   4. Screening for depression Z13.89 V79.0 DEPRESSION SCREEN ANNUAL   5. Pure hypercholesterolemia E78.00 272.0 LIPID PANEL      METABOLIC PANEL, COMPREHENSIVE      TSH 3RD GENERATION      URINALYSIS W/ RFLX MICROSCOPIC   6. Schizophrenia, unspecified type ([facility] F20.9 295.90    7. Recurrent depression (HCC) F33.9 296.30    8. Venereal disease screening Z11.3 V74.5 HIV 1/2 AG/AB, 4TH GENERATION,W RFLX CONFIRM   9. Hyperglycemia R73.9 790.29 HEMOGLOBIN A1C WITH EAG   10. Tinea cruris B35.6 110.3 ketoconazole (NIZORAL) 2 % topical cream   11. Intertrigo L30.4 695.89 nystatin (MYCOSTATIN) powder     Obesity  - The patient is asked to make an attempt to improve diet and exercise patterns to aid in medical management of this problem. begin progressive daily aerobic exercise program, attempt to lose weight, continue current medications and return for routine annual checkups    Follow-up Disposition:  Return in about 1 year (around 5/15/2019), or as needed, for MWV. I have discussed the diagnosis with the patient and the intended plan as seen in the above orders. The patient has received an after-visit summary and questions were answered concerning future plans. I have discussed medication side effects and warnings with the patient as well. I have reviewed the plan of care with the patient, accepted their input and they are in agreement with the treatment goals.        Audi Scott MD  May 15, 2018    This is the Subsequent Medicare Annual Wellness Exam, performed 12 months or more after the Initial AWV or the last Subsequent AWV    I have reviewed the patient's medical history in detail and updated the computerized patient record. History     Past Medical History:   Diagnosis Date    Contact dermatitis and other eczema, due to unspecified cause     Depression     Headache(784.0)     Lymphadenopathy 05-    Psychotic disorder     Pure hypercholesterolemia 8/10/2017    Schizophrenia ([facility]       History reviewed. No pertinent surgical history. Current Outpatient Prescriptions   Medication Sig Dispense Refill    ketoconazole (NIZORAL) 2 % topical cream Apply  to affected area two (2) times a day. 30 g 1    nystatin (MYCOSTATIN) powder Apply to rash 4 times a day 60 g 2    naproxen (NAPROSYN) 500 mg tablet Take 1 Tab by mouth two (2) times a day. For foot/ankle pain 60 Tab 0    ARIPiprazole (ABILIFY) 20 mg tablet Take 1 Tab by mouth daily. 30 Tab 0    sertraline (ZOLOFT) 100 mg tablet Take 1 Tab by mouth daily. 30 Tab 0    multivitamin (ONE A DAY) tablet Take 1 Tab by mouth daily.  traZODone (DESYREL) 100 mg tablet Take 1 Tab by mouth nightly. 27 Tab 1     No Known Allergies  Family History   Problem Relation Age of Onset    Cancer Mother      Nonhodgkins lymphoma    Diabetes Father     Hypertension Father      Social History   Substance Use Topics    Smoking status: Never Smoker    Smokeless tobacco: Never Used    Alcohol use No     Patient Active Problem List   Diagnosis Code    [facility]bid [facility] ([facility] E66.01    Pure hypercholesterolemia E78.00    Family history of non-Hodgkin's lymphoma Z80.7    Schizophrenia ([facility] F20.9    Recurrent depression ([facility] F33.9       Depression Risk Factor Screening:   No flowsheet data found. Alcohol Risk Factor Screening: You do not drink alcohol or very rarely. Functional Ability and Level of Safety:   Hearing Loss  Hearing is good. Activities of Daily Living  The home contains: no safety equipment.   Patient needs help with:  transportation and managing money    Fall Risk  No flowsheet data found. Abuse Screen  Patient is not abused    Cognitive Screening   Evaluation of Cognitive Function:  Has your family/caregiver stated any concerns about your memory: no  Normal    Patient Care Team   Patient Care Team:  Miguelina Goodwin MD as PCP - General (Internal Medicine)  Marnie Fabian, RN as [address] Kurtz NP as Physician (Psychology)  Sundar Herndon MD (Psychiatry)    Assessment/Plan   Education and counseling provided:  Diabetes screening test    Diagnoses and all orders for this visit:    1. Morbid obesity ([facility]    2. Medicare annual wellness visit, subsequent  -     CBC WITH AUTOMATED DIFF; Future    3. Screening for alcoholism  -     Annual  Alcohol Screen 15 min ()    4. Screening for depression  -     Depression Screen Annual    5. Pure hypercholesterolemia  -     LIPID PANEL; Future  -     METABOLIC PANEL, COMPREHENSIVE; Future  -     TSH 3RD GENERATION; Future  -     URINALYSIS W/ RFLX MICROSCOPIC; Future    6. Schizophrenia, unspecified type [facility])  Assessment & Plan: This condition is managed by Specialist.  Key Psychotherapeutic Meds             ARIPiprazole (ABILIFY) 20 mg tablet  (Taking) Take 1 Tab by mouth daily. sertraline (ZOLOFT) 100 mg tablet  (Taking) Take 1 Tab by mouth daily. traZODone (DESYREL) 100 mg tablet  (Taking) Take 1 Tab by mouth nightly. Other [address] [place] [address]     The patient is on no other behavioral health meds. Lab Results   Component Value Date/Time    Sodium 139 06/26/2017 11:34 AM    Creatinine 0.64 06/26/2017 11:34 AM    TSH 1.400 06/26/2017 11:34 AM    WBC 6.9 06/26/2017 11:34 AM    ALT (SGPT) 18 06/26/2017 11:34 AM    AST (SGOT) 13 06/26/2017 11:34 AM         7. Recurrent depression ([facility]  Assessment & Plan:   This condition is managed by Specialist.  Key Psychotherapeutic Meds             ARIPiprazole (ABILIFY) 20 mg tablet  (Taking) Take 1 Tab by mouth daily.    sertraline (ZOLOFT) 100 mg tablet  (Taking) Take 1 Tab by mouth daily. traZODone (DESYREL) 100 mg tablet  (Taking) Take 1 Tab by mouth nightly. Other [address] [place] [address]     The patient is on no other behavioral health meds. Lab Results   Component Value Date/Time    Sodium 139 06/26/2017 11:34 AM    Creatinine 0.64 06/26/2017 11:34 AM    TSH 1.400 06/26/2017 11:34 AM    WBC 6.9 06/26/2017 11:34 AM    ALT (SGPT) 18 06/26/2017 11:34 AM    AST (SGOT) 13 06/26/2017 11:34 AM         8. Venereal disease screening  -     HIV 1/2 AG/AB, 4TH GENERATION,W RFLX CONFIRM; Future    9. Hyperglycemia  -     HEMOGLOBIN A1C WITH EAG; Future    10. Tinea cruris  -     ketoconazole (NIZORAL) 2 % topical cream; Apply  to affected area two (2) times a day. 11. Intertrigo  -     nystatin (MYCOSTATIN) powder; Apply to rash 4 times a day      There are no preventive care reminders to display for this patient.

## 2018-05-15 NOTE — ASSESSMENT & PLAN NOTE
This condition is managed by Specialist.  Key Psychotherapeutic Meds             ARIPiprazole (ABILIFY) 20 mg tablet  (Taking) Take 1 Tab by mouth daily. sertraline (ZOLOFT) 100 mg tablet  (Taking) Take 1 Tab by mouth daily. traZODone (DESYREL) 100 mg tablet  (Taking) Take 1 Tab by mouth nightly. Other [address] [place] [address]     The patient is on no other behavioral health meds.         Lab Results   Component Value Date/Time    Sodium 139 06/26/2017 11:34 AM    Creatinine 0.64 06/26/2017 11:34 AM    TSH 1.400 06/26/2017 11:34 AM    WBC 6.9 06/26/2017 11:34 AM    ALT (SGPT) 18 06/26/2017 11:34 AM    AST (SGOT) 13 06/26/2017 11:34 AM

## 2018-05-15 NOTE — MR AVS SNAPSHOT
[facility] 
 
 
 [id number] [place] [address] [address] [id number] 
[phone] Patient: Justina Hodgkins MRN: [id number] [facility]:[birth year] Visit Information Date & Time Provider Department Dept. Phone Encounter #  
 5/15/2018  9:15 AM Elia Rajan, [address] [place] [address] [phone] [id number] Follow-up Instructions Return in about 1 year (around 5/15/2019), or as needed, for MWV. Follow-up and Disposition History Your Appointments 6/21/2018 10:30 AM  
Office Visit with SAMI Diehl 23 ([facility]) Appt Note: mwv  
 [address]  
  
   
 [address] Upcoming Health Maintenance Date Due  
 MEDICARE YEARLY EXAM 6/22/2018 Influenza Age 5 to Adult 8/1/2018 PAP AKA CERVICAL CYTOLOGY 12/23/2018 DTaP/Tdap/Td series (2 - Td) 12/14/2026 Allergies as of 5/15/2018  Review Complete On: 5/15/2018 By: Liliana Graham LPN No Known Allergies Current Immunizations  Reviewed on 12/14/2016 Name Date Influenza Vaccine 10/29/2014, 10/14/2013 Influenza Vaccine Elenora Gemma) 12/22/2015 Influenza Vaccine (Quad) PF 10/14/2016 Influenza Vaccine Split 10/22/2012 TB Skin Test (PPD) Intradermal 6/26/2017 Tdap 12/14/2016 10:00 AM  
  
 Not reviewed this visit You Were Diagnosed With   
  
 Codes Comments Morbid obesity ([facility]    -  Primary ICD-10-CM: E66.01 
ICD-9-CM: 278.01 Medicare annual wellness visit, subsequent     ICD-10-CM: Z00.00 ICD-9-CM: V70.0 Screening for alcoholism     ICD-10-CM: Z13.89 ICD-9-CM: V79.1 Screening for depression     ICD-10-CM: Z13.89 ICD-9-CM: V79.0 Pure hypercholesterolemia     ICD-10-CM: E78.00 ICD-9-CM: 272.0 Schizophrenia, unspecified type ([address]     ICD-10-CM: F20.9 ICD-9-CM: 295.90 Recurrent depression ([facility]     ICD-10-CM: F33.9 ICD-9-CM: 296.30 Venereal disease screening     ICD-10-CM: Z11.3 ICD-9-CM: V74.5 Hyperglycemia     ICD-10-CM: R73.9 ICD-9-CM: 790.29 Tinea cruris     ICD-10-CM: B35.6 ICD-9-CM: 110.3 Intertrigo     ICD-10-CM: L30.4 ICD-9-CM: 695.89 Vitals BP Pulse Temp Resp Height(growth percentile) Weight(growth percentile) 123/65 82 96.2 °F (35.7 °C) (Oral) 20 5' 2\" (1.575 m) 250 lb 9.6 oz (113.7 kg) LMP SpO2 BMI OB Status Smoking Status 05/01/2018 99% 45.84 kg/m2 Having regular periods Never Smoker BMI and BSA Data Body Mass Index Body Surface Area 45.84 kg/m 2 2.23 m 2 Preferred Pharmacy Pharmacy Name Phone [address], [facility] [address] Your Updated Medication List  
  
   
This list is accurate as of 5/15/18  9:53 AM.  Always use your most recent med list.  
  
  
  
  
 ARIPiprazole 20 mg tablet Commonly known as:  ABILIFY Take 1 Tab by mouth daily. ketoconazole 2 % topical cream  
Commonly known as:  NIZORAL Apply  to affected area two (2) times a day. multivitamin tablet Commonly known as:  ONE A DAY Take 1 Tab by mouth daily. naproxen 500 mg tablet Commonly known as:  NAPROSYN Take 1 Tab by mouth two (2) times a day. For foot/ankle pain  
  
 nystatin powder Commonly known as:  MYCOSTATIN Apply to rash 4 times a day  
  
 sertraline 100 mg tablet Commonly known as:  ZOLOFT Take 1 Tab by mouth daily. traZODone 100 mg tablet Commonly known as:  Margot Runner Take 1 Tab by mouth nightly. Prescriptions Sent to Pharmacy Refills  
 ketoconazole (NIZORAL) 2 % topical cream 1 Sig: Apply  to affected area two (2) times a day. Class: Normal  
 Pharmacy: [address], [facility] #: [phone] Route: Topical  
 nystatin (MYCOSTATIN) powder 2 Sig: Apply to rash 4 times a day  Class: Normal  
 Pharmacy: [address] #: [phone] We Performed the Following [address] [[id number] [facility]] [place] ANNUAL ALCOHOL SCREEN 15 MIN [id number] [facility]] Follow-up Instructions Return in about 1 year (around 5/15/2019), or as needed, for MWV. To-Do List   
 05/15/2018 Lab:  CBC WITH AUTOMATED DIFF   
  
 05/15/2018 Lab:  HEMOGLOBIN A1C WITH EAG   
  
 05/15/2018 Lab:  HIV 1/2 AG/AB, 4TH GENERATION,W RFLX CONFIRM   
  
 05/15/2018 Lab:  LIPID PANEL   
  
 05/15/2018 Lab:  METABOLIC PANEL, COMPREHENSIVE   
  
 05/15/2018 Lab:  TSH 3RD GENERATION Around 05/15/2018 Lab:  URINALYSIS W/ RFLX MICROSCOPIC Patient Instructions Medicare Wellness Visit, Female The best way to live healthy is to have a healthy lifestyle by eating a well-balanced diet, exercising regularly, limiting alcohol and stopping smoking. Regular physical exams and screening tests are another way to keep healthy. Preventive exams provided by your health care provider can find health problems before they become diseases or illnesses. Preventive services including immunizations, screening tests, monitoring and exams can help you take care of your own health. All people over age 72 should have a pneumovax  and and a prevnar shot to prevent pneumonia. These are once in a lifetime unless you and your provider decide differently. All people over 65 should have a yearly flu shot and a tetanus vaccine every 10 years. A bone mass density to screen for osteoporosis or thinning of the bones should be done every 2 years after 65. Screening for diabetes mellitus with a blood sugar test should be done every year.  
 
Glaucoma is a disease of the eye due to increased ocular pressure that can lead to blindness and it should be done every year by an eye professional. 
 
 Cardiovascular screening tests that check for elevated lipids (fatty part of blood) which can lead to heart disease and strokes should be done every 5 years. Colorectal screening that evaluates for blood or polyps in your colon should be done yearly as a stool test or every five years as a flexible sigmoidoscope or every 10 years as a colonoscopy up to age 76. Breast cancer screening with a mammogram is recommended biennially  for women age 54-69. Screening for cervical cancer with a pap smear and pelvic exam is recommended for women after age 72 years every 2 years up to age 79 or when the provider and patient decide to stop. If there is a history of cervical abnormalities or other increased risk for cancer then the test is recommended yearly. Hepatitis C screening is also recommended for anyone born between 80 through [address]. A shingles vaccine is also recommended once in a lifetime after age 61. Your Medicare Wellness Exam is recommended annually. Here is a list of your current Health Maintenance items with a due date: There are no preventive care reminders to display for this patient. Introducing [facility] & HEALTH SERVICES! [facility] introduces "[employer]" patient portal. Now you can access parts of your medical record, email your doctor's office, and request medication refills online. 1. In your internet browser, go to [email or web address] 2. Click on the First Time User? Click Here link in the Sign In box. You will see the New Member Sign Up page. 3. Enter your "[employer] Access Code exactly as it appears below. You will not need to use this code after youve completed the sign-up process. If you do not sign up before the expiration date, you must request a new code. · "[employer]" Access Code: [email or web address] Expires: 8/13/2018  9:18 AM 
 
4.  Enter the last four digits of your Social Security Number (xxxx) and Date of Birth (mm/dd/yyyy) as indicated and click Submit. You will be taken to the next sign-up page. 5. Create a [employer] ID. This will be your [employer] login ID and cannot be changed, so think of one that is secure and easy to remember. 6. Create a [employer] password. You can change your password at any time. 7. Enter your Password Reset Question and Answer. This can be used at a later time if you forget your password. 8. Enter your e-mail address. You will receive e-mail notification when new information is available in [address] 9. Click Sign Up. You can now view and download portions of your medical record. 10. Click the Download Summary menu link to download a portable copy of your medical information. If you have questions, please visit the Frequently Asked Questions section of the [employer] website. Remember, [employer] is NOT to be used for urgent needs. For medical emergencies, dial 911. Now available from your iPhone and Android! Please provide this summary of care documentation to your next provider. Your primary care clinician is listed as April Grice. If you have any questions after today's visit, please call [phone].

## 2018-05-16 LAB
HIV 1+2 AB+HIV1 P24 AG SERPL QL IA: NONREACTIVE
HIV12 RESULT COMMENT, HHIVC: NORMAL

## 2018-06-28 ENCOUNTER — OFFICE VISIT (OUTPATIENT)
Dept: FAMILY MEDICINE CLINIC | Age: 33
End: 2018-06-28

## 2018-06-28 VITALS
BODY MASS INDEX: 45.71 KG/M2 | TEMPERATURE: 98.2 F | SYSTOLIC BLOOD PRESSURE: 136 MMHG | RESPIRATION RATE: 16 BRPM | HEART RATE: 65 BPM | DIASTOLIC BLOOD PRESSURE: 70 MMHG | HEIGHT: 62 IN | WEIGHT: 248.4 LBS

## 2018-06-28 NOTE — PROGRESS NOTES
This is an erroneousness encounter for medicare wellness visit. Patient had examination done by Dr Roque Pinto 5/15/18    Attempt to explain to patient's sister reason for erroneous encounter unsuccessful. She was upset, screaming when she was if Dr Kev Andino was patient PCP- patient sister grew louder over the phone stating  she can not understand and request.ed to speak to some one else.   Patient confirm PCP as Dr Nicole  was notified of the above

## 2018-12-11 ENCOUNTER — OFFICE VISIT (OUTPATIENT)
Dept: FAMILY MEDICINE CLINIC | Age: 33
End: 2018-12-11

## 2018-12-11 VITALS
HEIGHT: 62 IN | DIASTOLIC BLOOD PRESSURE: 67 MMHG | WEIGHT: 248 LBS | RESPIRATION RATE: 16 BRPM | BODY MASS INDEX: 45.64 KG/M2 | TEMPERATURE: 97.4 F | SYSTOLIC BLOOD PRESSURE: 114 MMHG | HEART RATE: 80 BPM

## 2018-12-11 DIAGNOSIS — L21.0 DANDRUFF, ADULT: ICD-10-CM

## 2018-12-11 DIAGNOSIS — Z23 ENCOUNTER FOR IMMUNIZATION: Primary | ICD-10-CM

## 2018-12-11 NOTE — PROGRESS NOTES
1. Have you been to the ER, urgent care clinic since your last visit? Hospitalized since your last visit? No    2. Have you seen or consulted any other health care providers outside of the [address] [place] [address] since your last visit? Include any pap smears or colon screening.  No

## 2018-12-11 NOTE — PROGRESS NOTES
Subjective:   John Glover is a 26 y.o. female here for an acute visit for    Chief Complaint   Patient presents with    Hair/Scalp Problem     x years and is getting worse       HPI    Onset years   Location scalp   Duration constant   Characteristics Gets relaxer with no lye,  Product burns with appliction, neutralizer burns, scalp is red after, wash hair every 2-3 weeks because flaky like dandruff, one time there was a sour smell and there was dandruff   Aggrevating Relaxer products, shampooing   Relieving nothing   Treatment nothing   Pertinent PMH dandruf   Pertinent negatives Bleeding, hair loss, open lesions     ROS  As above, the rest are negative   ROS    Current Outpatient Medications   Medication Sig Dispense Refill    nystatin (MYCOSTATIN) powder Apply to rash 4 times a day 60 g 2    ARIPiprazole (ABILIFY) 20 mg tablet Take 1 Tab by mouth daily. 30 Tab 0    sertraline (ZOLOFT) 100 mg tablet Take 1 Tab by mouth daily. 30 Tab 0    multivitamin (ONE A DAY) tablet Take 1 Tab by mouth daily.  traZODone (DESYREL) 100 mg tablet Take 1 Tab by mouth nightly. 30 Tab 1    naproxen (NAPROSYN) 500 mg tablet Take 1 Tab by mouth two (2) times a day. For foot/ankle pain 60 Tab 0          Patient Active Problem List   Diagnosis Code    Morbid obesity ([facility] E66.01    Pure hypercholesterolemia E78.00    Family history of non-Hodgkin's lymphoma Z80.7    Schizophrenia ([facility] F20.9    Recurrent depression ([facility] F33.9       No Known Allergies  Family History   Problem Relation Age of Onset    Cancer Mother         Nonhodgkins lymphoma    Diabetes Father     Hypertension Father        Objective:     Vitals:    12/11/18 0942   BP: 114/67   Pulse: 80   Resp: 16   Temp: 97.4 °F (36.3 °C)   TempSrc: Oral   Weight: 248 lb (112.5 kg)   Height: 5' 2\" (1.575 m)     Body mass index is 45.36 kg/m².     Physical Exam  Physical Exam   Constitutional: She is oriented to person, place, and time and well-developed, well-nourished, and in no distress. HENT:   Head: Normocephalic and atraumatic. Hair is abnormal.   Entire scalp is coated with white, dry, flaky skin  Unable to visualize the actual scalp  White flakes come off with scraping but only the top layer   Eyes: Conjunctivae and lids are normal. Pupils are equal, round, and reactive to light. Neck: Normal range of motion and full passive range of motion without pain. Cardiovascular: Normal rate, regular rhythm and normal heart sounds. Pulmonary/Chest: Effort normal and breath sounds normal.   Neurological: She is alert and oriented to person, place, and time. Gait normal.   Lives with her sister  Refers to asking or talking to her sister for all situations   Skin: Skin is warm and dry. Psychiatric: Mood, memory, affect and judgment normal.   Nursing note and vitals reviewed. Labwork and Ancillary Studies:    CBC w/Diff  Lab Results   Component Value Date/Time    WBC 6.3 05/15/2018 10:16 AM    HGB 11.6 (L) 05/15/2018 10:16 AM    PLATELET 755 00/09/0790 10:16 AM         Basic Metabolic Profile  Lab Results   Component Value Date/Time    Sodium 140 05/15/2018 10:16 AM    Potassium 3.9 05/15/2018 10:16 AM    Chloride 103 05/15/2018 10:16 AM    CO2 29 05/15/2018 10:16 AM    Anion gap 8 05/15/2018 10:16 AM    Glucose 94 05/15/2018 10:16 AM    BUN 10 05/15/2018 10:16 AM    Creatinine 0.78 05/15/2018 10:16 AM    BUN/Creatinine ratio 13 05/15/2018 10:16 AM    GFR est AA >60 05/15/2018 10:16 AM    GFR est non-AA >60 05/15/2018 10:16 AM    Calcium 8.9 05/15/2018 10:16 AM        Cholesterol  Lab Results   Component Value Date/Time    Cholesterol, total 201 (H) 05/15/2018 10:16 AM    HDL Cholesterol 75 (H) 05/15/2018 10:16 AM    LDL, calculated 110.6 (H) 05/15/2018 10:16 AM    Triglyceride 77 05/15/2018 10:16 AM    CHOL/HDL Ratio 2.7 05/15/2018 10:16 AM          Assessment/Plan:    Diagnoses and all orders for this visit:    1.  Encounter for immunization  - INFLUENZA VIRUS VAC QUAD,SPLIT,PRESV FREE SYRINGE IM  -     HI IMMUNIZ ADMIN,1 SINGLE/COMB VAC/TOXOID    2. Dandruff, adult  -     REFERRAL TO DERMATOLOGY    Severe case of dandruff, refer to dermatology. Dr. Judith Sutton is able to see her today. Walked Mrs. Castaneda to Dr. Judith Sutton office and introduced her to the staff. Spoke with Mrs. Jacqueline Mccall sister, Devendra Pink, on the phone and updated her on current situation. Mrs. Lorena Fregoso states her sister can have access to all of her medical information and wishes for Devendra Pink to be spoken to for any needed information. Mrs. Lorena Fregoso and Devendra Pink both expressed a desire to have Dr. Delta Flores as her PCP. Health Maintenance:   Health Maintenance   Topic Date Due    Influenza Age 5 to Adult  08/01/2018    PAP AKA CERVICAL CYTOLOGY  12/23/2018    MEDICARE YEARLY EXAM  05/16/2019    DTaP/Tdap/Td series (2 - Td) 12/14/2026       I have discussed the diagnosis with the patient and the intended plan as seen in the above orders. The patient has received an After-Visit Summary and questions were answered concerning future plans. Return to clinic if sxs persist, to ER if sxs worsen    Patient verbalized understanding to above instructions. AVS printed and given to pt. Follow-up Disposition:  Return in about 6 months (around 6/11/2019) for AWV/gyn exam: Delta Flores. Ramya Tomlinson, [facility]  [address]  [id number]

## 2019-06-11 ENCOUNTER — HOSPITAL ENCOUNTER (OUTPATIENT)
Dept: LAB | Age: 34
Discharge: HOME | End: 2019-06-11
Payer: MEDICARE

## 2019-06-11 ENCOUNTER — OFFICE VISIT (OUTPATIENT)
Dept: FAMILY MEDICINE CLINIC | Age: 34
End: 2019-06-11

## 2019-06-11 VITALS
DIASTOLIC BLOOD PRESSURE: 58 MMHG | WEIGHT: 249.2 LBS | TEMPERATURE: 98.5 F | HEART RATE: 80 BPM | HEIGHT: 62 IN | OXYGEN SATURATION: 99 % | RESPIRATION RATE: 16 BRPM | SYSTOLIC BLOOD PRESSURE: 118 MMHG | BODY MASS INDEX: 45.86 KG/M2

## 2019-06-11 DIAGNOSIS — F51.04 CHRONIC INSOMNIA: ICD-10-CM

## 2019-06-11 DIAGNOSIS — Z13.1 SCREENING FOR DIABETES MELLITUS: ICD-10-CM

## 2019-06-11 DIAGNOSIS — Z13.6 SCREENING FOR ISCHEMIC HEART DISEASE: ICD-10-CM

## 2019-06-11 DIAGNOSIS — F20.9 SCHIZOPHRENIA, UNSPECIFIED TYPE (HCC): ICD-10-CM

## 2019-06-11 DIAGNOSIS — E66.01 MORBID OBESITY (HCC): ICD-10-CM

## 2019-06-11 DIAGNOSIS — B37.2 CANDIDAL INTERTRIGO: ICD-10-CM

## 2019-06-11 DIAGNOSIS — Z00.00 MEDICARE ANNUAL WELLNESS VISIT, SUBSEQUENT: Primary | ICD-10-CM

## 2019-06-11 DIAGNOSIS — F32.1 CURRENT MODERATE EPISODE OF MAJOR DEPRESSIVE DISORDER, UNSPECIFIED WHETHER RECURRENT (HCC): ICD-10-CM

## 2019-06-11 DIAGNOSIS — L21.9 SEBORRHEIC DERMATITIS OF SCALP: ICD-10-CM

## 2019-06-11 DIAGNOSIS — Z12.4 SCREENING FOR CERVICAL CANCER: ICD-10-CM

## 2019-06-11 LAB
CHOLEST SERPL-MCNC: 233 MG/DL
GLUCOSE SERPL-MCNC: 87 MG/DL (ref 74–99)
HDLC SERPL-MCNC: 80 MG/DL (ref 40–60)
HDLC SERPL: 2.9 {RATIO} (ref 0–5)
LDLC SERPL CALC-MCNC: 127.4 MG/DL (ref 0–100)
LIPID PROFILE,FLP: ABNORMAL
TRIGL SERPL-MCNC: 128 MG/DL (ref ?–150)
VLDLC SERPL CALC-MCNC: 25.6 MG/DL

## 2019-06-11 PROCEDURE — 80061 LIPID PANEL: CPT

## 2019-06-11 PROCEDURE — 82947 ASSAY GLUCOSE BLOOD QUANT: CPT

## 2019-06-11 PROCEDURE — 36415 COLL VENOUS BLD VENIPUNCTURE: CPT

## 2019-06-11 NOTE — PROGRESS NOTES
Chief Complaint   Patient presents with   [facility] Annual Wellness Visit     Patient not fasting     1. Have you been to the ER, urgent care clinic since your last visit? Hospitalized since your last visit? No    2. Have you seen or consulted any other health care providers outside of the [address] [place] [address] since your last visit? Include any pap smears or colon screening.  Yes, [facility] visit

## 2019-06-11 NOTE — PATIENT INSTRUCTIONS
Medicare Wellness Visit, Female The best way to live healthy is to have a lifestyle where you eat a well-balanced diet, exercise regularly, limit alcohol use, and quit all forms of tobacco/nicotine, if applicable. Regular preventive services are another way to keep healthy. Preventive services (vaccines, screening tests, monitoring & exams) can help personalize your care plan, which helps you manage your own care. Screening tests can find health problems at the earliest stages, when they are easiest to treat. Jameel Brooks follows the current, evidence-based guidelines published by the [facility] ([facility]) when recommending preventive services for our patients. Because we follow these guidelines, sometimes recommendations change over time as research supports it. (For example, mammograms used to be recommended annually. Even though Medicare will still pay for an annual mammogram, the newer guidelines recommend a mammogram every two years for women of average risk.) Of course, you and your doctor may decide to screen more often for some diseases, based on your risk and your health status. Preventive services for you include: - Medicare offers their members a free annual wellness visit, which is time for you and your primary care provider to discuss and plan for your preventive service needs. Take advantage of this benefit every year! 
-All adults over the age of 72 should receive the recommended pneumonia vaccines. Current USPSTF guidelines recommend a series of two vaccines for the best pneumonia protection.  
-All adults should have a flu vaccine yearly and a tetanus vaccine every 10 years. All adults age 61 and older should receive a shingles vaccine once in their lifetime.   
-A bone mass density test is recommended when a woman turns 65 to screen for osteoporosis. This test is only recommended one time, as a screening. Some providers will use this same test as a disease monitoring tool if you already have osteoporosis. -All adults age 38-68 who are overweight should have a diabetes screening test once every three years.  
-Other screening tests and preventive services for persons with diabetes include: an eye exam to screen for diabetic retinopathy, a kidney function test, a foot exam, and stricter control over your cholesterol.  
-Cardiovascular screening for adults with routine risk involves an electrocardiogram (ECG) at intervals determined by your doctor.  
-Colorectal cancer screenings should be done for adults age 54-65 with no increased risk factors for colorectal cancer. There are a number of acceptable methods of screening for this type of cancer. Each test has its own benefits and drawbacks. Discuss with your doctor what is most appropriate for you during your annual wellness visit. The different tests include: colonoscopy (considered the best screening method), a fecal occult blood test, a fecal DNA test, and sigmoidoscopy. -Breast cancer screenings are recommended every other year for women of normal risk, age 54-69. 
-Cervical cancer screenings for women over age 72 are only recommended with certain risk factors.  
-All adults born between [facility] should be screened once for Hepatitis C. Here is a list of your current Health Maintenance items (your personalized list of preventive services) with a due date: 
Health Maintenance Due Topic Date Due  
 Pap Test  12/23/2018 Marly Parra Annual Well Visit  05/16/2019 Medicare Wellness Visit, Female The best way to live healthy is to have a lifestyle where you eat a well-balanced diet, exercise regularly, limit alcohol use, and quit all forms of tobacco/nicotine, if applicable. Regular preventive services are another way to keep healthy.  Preventive services (vaccines, screening tests, monitoring & exams) can help personalize your care plan, which helps you manage your own care. Screening tests can find health problems at the earliest stages, when they are easiest to treat. Jameel Brooks follows the current, evidence-based guidelines published by the [facility] ([facility]) when recommending preventive services for our patients. Because we follow these guidelines, sometimes recommendations change over time as research supports it. (For example, mammograms used to be recommended annually. Even though Medicare will still pay for an annual mammogram, the newer guidelines recommend a mammogram every two years for women of average risk.) Of course, you and your doctor may decide to screen more often for some diseases, based on your risk and your health status. Preventive services for you include: - Medicare offers their members a free annual wellness visit, which is time for you and your primary care provider to discuss and plan for your preventive service needs. Take advantage of this benefit every year! 
-All adults over the age of 72 should receive the recommended pneumonia vaccines. Current USPSTF guidelines recommend a series of two vaccines for the best pneumonia protection.  
-All adults should have a flu vaccine yearly and a tetanus vaccine every 10 years. All adults age 61 and older should receive a shingles vaccine once in their lifetime.   
-A bone mass density test is recommended when a woman turns 65 to screen for osteoporosis. This test is only recommended one time, as a screening. Some providers will use this same test as a disease monitoring tool if you already have osteoporosis.  
-All adults age 38-68 who are overweight should have a diabetes screening test once every three years.  
-Other screening tests and preventive services for persons with diabetes include: an eye exam to screen for diabetic retinopathy, a kidney function test, a foot exam, and stricter control over your cholesterol.  
-Cardiovascular screening for adults with routine risk involves an electrocardiogram (ECG) at intervals determined by your doctor.  
-Colorectal cancer screenings should be done for adults age 54-65 with no increased risk factors for colorectal cancer. There are a number of acceptable methods of screening for this type of cancer. Each test has its own benefits and drawbacks. Discuss with your doctor what is most appropriate for you during your annual wellness visit. The different tests include: colonoscopy (considered the best screening method), a fecal occult blood test, a fecal DNA test, and sigmoidoscopy. -Breast cancer screenings are recommended every other year for women of normal risk, age 54-69. 
-Cervical cancer screenings for women over age 72 are only recommended with certain risk factors.  
-All adults born between [facility] should be screened once for Hepatitis C. Here is a list of your current Health Maintenance items (your personalized list of preventive services) with a due date: 
Health Maintenance Due Topic Date Due  
 Pap Test  12/23/2018 Christina Annual Well Visit  05/16/2019

## 2019-06-11 NOTE — PROGRESS NOTES
Chief Complaint   Patient presents with   Allen Snider Annual Wellness Visit     Patient not fasting    Follow Up Chronic Condition       Pt is a 26y.o. year old female who presents for follow up of her chronic medical problems  First visit with me    Dandruff-saw Derm 12/2018 and has a shampoo she could use; patient says it has gotten better since    Wt Readings from Last 3 Encounters:   06/11/19 249 lb 3.2 oz (113 kg)   12/11/18 248 lb (112.5 kg)   06/28/18 248 lb 6.4 oz (112.7 kg)   discussed weight loss strategies with her  BMI 45    Lab Results   Component Value Date/Time    Cholesterol, total 201 (H) 05/15/2018 10:16 AM    HDL Cholesterol 75 (H) 05/15/2018 10:16 AM    LDL, calculated 110.6 (H) 05/15/2018 10:16 AM    VLDL, calculated 15.4 05/15/2018 10:16 AM    Triglyceride 77 05/15/2018 10:16 AM    CHOL/HDL Ratio 2.7 05/15/2018 10:16 AM   not fasting today    Lab Results   Component Value Date/Time    Glucose 94 05/15/2018 10:16 AM       Sees Psych and therapist at [facility]- [employer] [facility]-on meds  Trazodone for sleep  Zoloft for depression  Schiz-on Abilify      Intermittent rash under the breast; when sweating a lot she says      ROS:    Pt denies: Wt loss, Fever/Chills, HA, Visual changes, Fatigue, Chest pain, SOB, TELLEZ, Abd pain, N/V/D/C, Blood in stool or urine, Edema. Pertinent positive as above in HPI.  All others were negative    Patient Active Problem List   Diagnosis Code    Morbid obesity ([address] E66.01    Pure hypercholesterolemia E78.00    Family history of non-Hodgkin's lymphoma Z80.7    Schizophrenia ([address] F20.9    Recurrent depression ([address] F33.9    Chronic insomnia F51.04    Current moderate episode of major depressive disorder ([address] F32.1    Seborrheic dermatitis of scalp L21.9       Past Medical History:   Diagnosis Date    Contact dermatitis and other eczema, due to unspecified cause     Depression     Headache(784.0)     Lymphadenopathy 05-    Psychotic disorder ([address]     Pure hypercholesterolemia 8/10/2017    Schizophrenia ([facility]        Current Outpatient Medications   Medication Sig Dispense Refill    ARIPiprazole (ABILIFY) 20 mg tablet Take 1 Tab by mouth daily. 30 Tab 0    sertraline (ZOLOFT) 100 mg tablet Take 1 Tab by mouth daily. 30 Tab 0    multivitamin (ONE A DAY) tablet Take 1 Tab by mouth daily.  traZODone (DESYREL) 100 mg tablet Take 1 Tab by mouth nightly. 30 Tab 1       Social History     Tobacco Use   Smoking Status Never Smoker   Smokeless Tobacco Never Used       No Known Allergies    Patient Labs were reviewed: yes      Patient Past Records were reviewed:  yes        Objective:     Vitals:    06/11/19 1009   BP: 118/58   Pulse: 80   Resp: 16   Temp: 98.5 °F (36.9 °C)   TempSrc: Oral   SpO2: 99%   Weight: 249 lb 3.2 oz (113 kg)   Height: 5' 2\" (1.575 m)     Body mass index is 45.58 kg/m². Exam:   Appearance: alert, well appearing,  oriented to person, place, and time, acyanotic, in no respiratory distress and well hydrated. HEENT:  NC/AT, pink conj, anicteric sclerae  Neck:  No cervical lymphadenopathy, no JVD, no thyromegaly, no carotid bruit  Heart:  RRR without M/R/G  Lungs:  CTAB, no rhonchi, rales, or wheezes with good air exchange   Abdomen:  Non-tender, pos bowel sounds, no hepatosplenomegaly  Ext:  No C/C/E    Skin: scaly scalp rash; rash under the breasts  Breast exam: no palpable masses bilaterally, no nipple discharge, no axillary adenopathy, no skin changes   Neuro: no lateralizing signs, CNs II-XII intact  Pelvic exam: NEG, on speculum exam cervix appeared normal, no AM/T; PAP done    Assessment/ Plan:   Diagnoses and all orders for this visit:    1. Medicare annual wellness visit, subsequent-see note below    2. Seborrheic dermatitis of scalp-advised on how to use the shampoo and to use it more often    3. Morbid obesity (HCC)-discussed limiting noa to 0912-7980/day and exercising at least 150 min a week    4.  Candidal intertrigo-advised to keep the area dry and to try OTC antifungal powders    5. Schizophrenia, unspecified type (HCC)-psych following, on Abilify    6. Chronic insomnia-on Trazodone c/o psych    8. Current moderate episode of major depressive disorder, unspecified whether recurrent (HCC)-Psych following, on Zoloft    9. Screening for cervical cancer-PAP with HPV testing done today    Follow-up and Dispositions    · Return in about 1 year (around 6/11/2020) for annual wellness. I have discussed the diagnosis with the patient and the intended plan as seen in the above orders. The patient has received an After-Visit Summary and questions were answered concerning future plans. Medication Side Effects and Warnings were discussed with patient: yes    Patient verbalized understanding of above instructions. Scott Partida MD  Internal Medicine  [facility]    This is the Subsequent Medicare Annual Wellness Exam, performed 12 months or more after the Initial AWV or the last Subsequent AWV    I have reviewed the patient's medical history in detail and updated the computerized patient record. History     Past Medical History:   Diagnosis Date    Contact dermatitis and other eczema, due to unspecified cause     Depression     Headache(784.0)     Lymphadenopathy 05-    Psychotic disorder ([facility]     Pure hypercholesterolemia 8/10/2017    Schizophrenia ([facility]       History reviewed. No pertinent surgical history. Current Outpatient Medications   Medication Sig Dispense Refill    ARIPiprazole (ABILIFY) 20 mg tablet Take 1 Tab by mouth daily. 30 Tab 0    sertraline (ZOLOFT) 100 mg tablet Take 1 Tab by mouth daily. 30 Tab 0    multivitamin (ONE A DAY) tablet Take 1 Tab by mouth daily.  traZODone (DESYREL) 100 mg tablet Take 1 Tab by mouth nightly.  27 Tab 1     No Known Allergies  Family History   Problem Relation Age of Onset    Cancer Mother         Nonhodgkins lymphoma    Diabetes Father     Hypertension Father      Social History     Tobacco Use    Smoking status: Never Smoker    Smokeless tobacco: Never Used   Substance Use Topics    Alcohol use: No     Alcohol/week: 0.0 oz     Patient Active Problem List   Diagnosis Code    Morbid obesity ([facility] E66.01    Pure hypercholesterolemia E78.00    Family history of non-Hodgkin's lymphoma Z80.7    Schizophrenia ([facility] F20.9    Recurrent depression ([facility] F33.9    Chronic insomnia F51.04    Current moderate episode of major depressive disorder ([facility] F32.1    Seborrheic dermatitis of scalp L21.9       Depression Risk Factor Screening:   No flowsheet data found. Alcohol Risk Factor Screening: You do not drink alcohol or very rarely. Functional Ability and Level of Safety:   Hearing Loss  Hearing is good. Activities of Daily Living  The home contains: handrails  Patient needs help with:  transportation, shopping, preparing meals, managing medications and managing money   Lives with sister who helps her with the above  Fall Risk  No flowsheet data found. Abuse Screen  Patient is not abused    Cognitive Screening   Evaluation of Cognitive Function:  Has your family/caregiver stated any concerns about your memory: no  Normal MMSE    Patient Care Team   Patient Care Team:  Rustam Ocampo MD as PCP - General (Internal Medicine)  Oscar Montejo RN as Ambulatory Care Navigator  Michael De Leon NP as Physician (Psychology)  Deny Baez MD (Psychiatry)    Assessment/Plan   Education and counseling provided:  Are appropriate based on today's review and evaluation  Influenza Vaccine-in Sept  Screening Mammography-at age 26 bec no family hx and she is a non smoker  Screening Pap and pelvic (covered once every 2 years)-done today  Cardiovascular screening blood test-today  Diabetes screening test-today    Diagnoses and all orders for this visit:    1.  Medicare annual wellness visit, subsequent-Refer to above for plan and to patient instructions for recommendations on HM    2. Screening for diabetes mellitus  -     GLUCOSE, RANDOM; Future    3. Screening for ischemic heart disease  -     LIPID PANEL;  Future          Health Maintenance Due   Topic Date Due    PAP AKA CERVICAL CYTOLOGY -done today 12/23/2018     RTC yearly for wellness visit

## 2019-06-15 LAB
CYTOLOGIST CVX/VAG CYTO: NORMAL
CYTOLOGY CVX/VAG DOC CYTO: NORMAL
CYTOLOGY CVX/VAG DOC THIN PREP: NORMAL
DX ICD CODE: NORMAL
HPV I/H RISK 4 DNA CVX QL PROBE+SIG AMP: NEGATIVE
Lab: NORMAL
Lab: NORMAL
OTHER STN SPEC: NORMAL
STAT OF ADQ CVX/VAG CYTO-IMP: NORMAL

## 2019-12-12 ENCOUNTER — OFFICE VISIT (OUTPATIENT)
Dept: FAMILY MEDICINE CLINIC | Age: 34
End: 2019-12-12

## 2019-12-12 VITALS
HEART RATE: 82 BPM | RESPIRATION RATE: 16 BRPM | WEIGHT: 248 LBS | TEMPERATURE: 96.4 F | DIASTOLIC BLOOD PRESSURE: 60 MMHG | SYSTOLIC BLOOD PRESSURE: 109 MMHG | BODY MASS INDEX: 45.64 KG/M2 | HEIGHT: 62 IN | OXYGEN SATURATION: 99 %

## 2019-12-12 DIAGNOSIS — L03.114 CELLULITIS OF LEFT UPPER EXTREMITY: Primary | ICD-10-CM

## 2019-12-12 RX ORDER — SULFAMETHOXAZOLE AND TRIMETHOPRIM 800; 160 MG/1; MG/1
1 TABLET ORAL 2 TIMES DAILY
Qty: 10 TAB | Refills: 0 | Status: SHIPPED | OUTPATIENT
Start: 2019-12-12 | End: 2019-12-17

## 2019-12-12 NOTE — PROGRESS NOTES
Bud Moss is a 37 y.o. female and presents with Skin Problem (Left arm painful possible boil or inflammed hair follicle cyst after shaving. )       Subjective:    Shaves arms and 3 days ago noticed small red patch appear on left arm just above elbow. Sister was able to drain some fluid from it and it appears to be perhaps slightly better after this. No history of immune deficiencies are on any immunosuppressive's. No history of skin infections and no history of MRSA. No fevers or chills or other symptoms at this point. Assessment/Plan:    Cellulitis left armwe will give course of antibiotics and patient will monitor this closely for improvement. Local skin care encouraged as well and discussed. Diagnoses and all orders for this visit:    1. Cellulitis of left upper extremity    Other orders  -     trimethoprim-sulfamethoxazole (BACTRIM DS, SEPTRA DS) 160-800 mg per tablet; Take 1 Tab by mouth two (2) times a day for 5 days. RTC prn   Orders Placed This Encounter    trimethoprim-sulfamethoxazole (BACTRIM DS, SEPTRA DS) 160-800 mg per tablet     Sig: Take 1 Tab by mouth two (2) times a day for 5 days. Dispense:  10 Tab     Refill:  0               ROS:  Negative except as mentioned above  Cardiac- no chest pain or palpitations  Pulmonary- no sob or wheezes  GI- no n/v or diarrhea. SH:  Social History     Tobacco Use    Smoking status: Never Smoker    Smokeless tobacco: Never Used   Substance Use Topics    Alcohol use: No     Alcohol/week: 0.0 standard drinks    Drug use: No         Medications/Allergies:  Current Outpatient Medications on File Prior to Visit   Medication Sig Dispense Refill    ARIPiprazole (ABILIFY) 20 mg tablet Take 1 Tab by mouth daily. 30 Tab 0    sertraline (ZOLOFT) 100 mg tablet Take 1 Tab by mouth daily. 30 Tab 0    multivitamin (ONE A DAY) tablet Take 1 Tab by mouth daily.  traZODone (DESYREL) 100 mg tablet Take 1 Tab by mouth nightly.  27 Tab 1     No current facility-administered medications on file prior to visit. No Known Allergies    Objective:  Visit Vitals  /60   Pulse 82   Temp 96.4 °F (35.8 °C) (Oral)   Resp 16   Ht 5' 2\" (1.575 m)   Wt 248 lb (112.5 kg)   LMP 12/11/2019   SpO2 99%   BMI 45.36 kg/m²    Body mass index is 45.36 kg/m². Constitutional: Well developed, nourished, no distress, alert   skin  left arm with approximately 1 cm central very slightly raised area with 2 to 3 mm central serosanguineous crusting approximately 2 to 3 cm of surrounding mild erythema as well no visible drainage.

## 2019-12-12 NOTE — PATIENT INSTRUCTIONS
Cellulitis: Care Instructions Your Care Instructions Cellulitis is a skin infection caused by bacteria, most often strep or staph. It often occurs after a break in the skin from a scrape, cut, bite, or puncture, or after a rash. Cellulitis may be treated without doing tests to find out what caused it. But your doctor may do tests, if needed, to look for a specific bacteria, like methicillin-resistant Staphylococcus aureus (MRSA). The doctor has checked you carefully, but problems can develop later. If you notice any problems or new symptoms, get medical treatment right away. Follow-up care is a key part of your treatment and safety. Be sure to make and go to all appointments, and call your doctor if you are having problems. It's also a good idea to know your test results and keep a list of the medicines you take. How can you care for yourself at home? · Take your antibiotics as directed. Do not stop taking them just because you feel better. You need to take the full course of antibiotics. · Prop up the infected area on pillows to reduce pain and swelling. Try to keep the area above the level of your heart as often as you can. · If your doctor told you how to care for your wound, follow your doctor's instructions. If you did not get instructions, follow this general advice: 
? Wash the wound with clean water 2 times a day. Don't use hydrogen peroxide or alcohol, which can slow healing. ? You may cover the wound with a thin layer of petroleum jelly, such as Vaseline, and a nonstick bandage. ? Apply more petroleum jelly and replace the bandage as needed. · Be safe with medicines. Take pain medicines exactly as directed. ? If the doctor gave you a prescription medicine for pain, take it as prescribed. ? If you are not taking a prescription pain medicine, ask your doctor if you can take an over-the-counter medicine. To prevent cellulitis in the future · Try to prevent cuts, scrapes, or other injuries to your skin. Cellulitis most often occurs where there is a break in the skin. · If you get a scrape, cut, mild burn, or bite, wash the wound with clean water as soon as you can to help avoid infection. Don't use hydrogen peroxide or alcohol, which can slow healing. · If you have swelling in your legs (edema), support stockings and good skin care may help prevent leg sores and cellulitis. · Take care of your feet, especially if you have diabetes or other conditions that increase the risk of infection. Wear shoes and socks. Do not go barefoot. If you have athlete's foot or other skin problems on your feet, talk to your doctor about how to treat them. When should you call for help? Call your doctor now or seek immediate medical care if: 
  · You have signs that your infection is getting worse, such as: 
? Increased pain, swelling, warmth, or redness. ? Red streaks leading from the area. ? Pus draining from the area. ? A fever.  
  · You get a rash.  
 Watch closely for changes in your health, and be sure to contact your doctor if: 
  · You do not get better as expected. Where can you learn more? Go to http://genevieve-jayro.info/. Juan Ozuna in the search box to learn more about \"Cellulitis: Care Instructions. \" Current as of: April 1, 2019 Content Version: 12.2 © 0742-2508 [employer]. Care instructions adapted under license by [employer] (which disclaims liability or warranty for this information). If you have questions about a medical condition or this instruction, always ask your healthcare professional. [address] any warranty or liability for your use of this information. Sulfamethoxazole/Trimethoprim (By mouth) Sulfamethoxazole (sul-fa-meth-OX-a-zole), Trimethoprim (trye-METH-oh-prim) Treats or prevents infections. Brand Name(s): Bactrim, Bactrim DS, SMZ-TMP Pediatric, Sulfatrim, Sulfatrim Pediatric There may be other brand names for this medicine. When This Medicine Should Not Be Used: This medicine is not right for everyone. Do not use it if you had an allergic reaction to trimethoprim, sulfamethoxazole, or any sulfa drug. Do not use this medicine if you are pregnant, if you have anemia caused by low levels of folic acid, or if you have a history of drug-induced thrombocytopenia. How to Use This Medicine:  
Liquid, Tablet · Your doctor will tell you how much medicine to use. Do not use more than directed. · Measure the oral liquid medicine with a marked measuring spoon, oral syringe, or medicine cup. · Drink extra fluids so you will urinate more often and help prevent kidney problems. · Take all of the medicine in your prescription to clear up your infection, even if you feel better after the first few doses. · Missed dose: Take a dose as soon as you remember. If it is almost time for your next dose, wait until then and take a regular dose. Do not take extra medicine to make up for a missed dose. · Store the medicine in a closed container at room temperature, away from heat, moisture, and direct light. Do not freeze the oral liquid. Drugs and Foods to Avoid: Ask your doctor or pharmacist before using any other medicine, including over-the-counter medicines, vitamins, and herbal products. · Some medicines can affect how this medicine works. Tell your doctor if you also use the following:  
¨ amantadine, cyclosporine, digoxin, indomethacin, memantine, methotrexate, phenytoin, pyrimethamine, or warfarin ¨ an ACE inhibitor, diabetes medicine (glipizide, glyburide, metformin, pioglitazone, repaglinide, rosiglitazone), a diuretic (water pill, such as hydrochlorothiazide), or a tricyclic antidepressant Warnings While Using This Medicine: · It is not safe to take this medicine during pregnancy.  It could harm an unborn baby. Tell your doctor right away if you become pregnant. · Tell your doctor if you are breastfeeding, or if you have kidney disease, liver disease, diabetes, malabsorption or malnutrition, folate deficiency, porphyria, thyroid problems, or a history of alcoholism. Tell your doctor if you have asthma or severe allergies, especially if you are allergic to any medicines. It is important for your doctor to know if you have HIV or AIDS, because this medicine might work differently for you. · This medicine may cause a severe allergic reaction. · This medicine may lower the number of platelets in your body, which are necessary for proper blood clotting. This may cause you to bleed or get infections more easily. Talk with your doctor if you have concerns about this. · This medicine can cause diarrhea. Call your doctor if the diarrhea becomes severe, does not stop, or is bloody. Do not take any medicine to stop diarrhea until you have talked to your doctor. Diarrhea can occur 2 months or more after you stop taking this medicine. · Tell any doctor or dentist who treats you that you are using this medicine. This medicine may affect certain medical test results. · Your doctor will do lab tests at regular visits to check on the effects of this medicine. Keep all appointments. · Keep all medicine out of the reach of children. Never share your medicine with anyone. Possible Side Effects While Using This Medicine:  
Call your doctor right away if you notice any of these side effects: · Allergic reaction: Itching or hives, swelling in your face or hands, swelling or tingling in your mouth or throat, chest tightness, trouble breathing · Blistering, peeling, or red skin rash · Dark urine or pale stools, nausea, vomiting, loss of appetite, stomach pain, yellow skin or eyes · Chest pain, cough, or trouble breathing · Confusion, weakness · Muscle twitching · Severe diarrhea, stomach pain, cramps, bloating · Skin rash, purple spots on your skin, or very pale or yellow skin · Sore throat, fever, muscle pain · Uneven heartbeat, numbness or tingling in your hands, feet, or lips · Unusual bleeding, bruising, or weakness If you notice these less serious side effects, talk with your doctor: · Mild nausea, vomiting, or loss of appetite If you notice other side effects that you think are caused by this medicine, tell your doctor. Call your doctor for medical advice about side effects. You may report side effects to FDA at 3-865-FDA-9304 © 2017 [address] Information is for End User's use only and may not be sold, redistributed or otherwise used for commercial purposes. The above information is an  only. It is not intended as medical advice for individual conditions or treatments. Talk to your doctor, nurse or pharmacist before following any medical regimen to see if it is safe and effective for you.

## 2019-12-12 NOTE — PROGRESS NOTES
1. Have you been to the ER, urgent care clinic since your last visit? Hospitalized since your last visit? No.     2. Have you seen or consulted any other health care providers outside of the [address] [place] [address] since your last visit? Include any pap smears or colon screening.  No.     Chief Complaint   Patient presents with    Skin Problem

## 2019-12-18 ENCOUNTER — OFFICE VISIT (OUTPATIENT)
Dept: FAMILY MEDICINE CLINIC | Age: 34
End: 2019-12-18

## 2019-12-18 VITALS
RESPIRATION RATE: 16 BRPM | HEART RATE: 86 BPM | SYSTOLIC BLOOD PRESSURE: 114 MMHG | DIASTOLIC BLOOD PRESSURE: 69 MMHG | BODY MASS INDEX: 45.08 KG/M2 | OXYGEN SATURATION: 97 % | WEIGHT: 245 LBS | HEIGHT: 62 IN | TEMPERATURE: 98.2 F

## 2019-12-18 DIAGNOSIS — R59.9 ENLARGED LYMPH NODE: Primary | ICD-10-CM

## 2019-12-18 NOTE — PROGRESS NOTES
1. Have you been to the ER, urgent care clinic since your last visit? Hospitalized since your last visit? No    2. Have you seen or consulted any other health care providers outside of the [address] [place] [address] since your last visit? Include any pap smears or colon screening.  No

## 2019-12-18 NOTE — PATIENT INSTRUCTIONS
Swollen Lymph Nodes: Care Instructions Your Care Instructions Lymph nodes are small, bean-shaped glands throughout the body. They help your body fight germs and infections. Lymph nodes often swell when there is a problem such as an injury, infection, or tumor. · The nodes in your neck, under your chin, or behind your ears may swell when you have a cold or sore throat. · An injury or infection in a leg or foot can make the nodes in your groin swell. · Sometimes medicine can make lymph nodes swell, but this is rare. Treatment depends on what caused your nodes to swell. Usually the nodes return to normal size without a problem. Follow-up care is a key part of your treatment and safety. Be sure to make and go to all appointments, and call your doctor if you are having problems. It's also a good idea to know your test results and keep a list of the medicines you take. How can you care for yourself at home? · Take your medicines exactly as prescribed. Call your doctor if you think you are having a problem with your medicine. · Avoid irritation. ? Do not squeeze or pick at the lump. ? Do not stick a needle in it. · Prevent infection. Do not squeeze, drain, or puncture a painful lump. Doing this can irritate or inflame the lump, push any existing infection deeper into the skin, or cause severe bleeding. · Get extra rest. Slow down just a little from your usual routine. · Drink plenty of fluids, enough so that your urine is light yellow or clear like water. If you have kidney, heart, or liver disease and have to limit fluids, talk with your doctor before you increase the amount of fluids you drink. · Take an over-the-counter pain medicine, such as acetaminophen (Tylenol), ibuprofen (Advil, Motrin), or naproxen (Aleve). Read and follow all instructions on the label. · Do not take two or more pain medicines at the same time unless the doctor told you to.  Many pain medicines have acetaminophen, which is Tylenol. Too much acetaminophen (Tylenol) can be harmful. When should you call for help? Call your doctor now or seek immediate medical care if: 
  · You have worse symptoms of infection, such as: 
? Increased pain, swelling, warmth, or redness. ? Red streaks leading from the area. ? Pus draining from the area. ? A fever.  
 Watch closely for changes in your health, and be sure to contact your doctor if: 
  · Your lymph nodes do not get smaller or do not return to normal.  
  · You do not get better as expected. Where can you learn more? Go to http://genevieve-jayro.info/. Enter D487 in the search box to learn more about \"Swollen Lymph Nodes: Care Instructions. \" Current as of: June 9, 2019 Content Version: 12.2 © 8248-7536 [employer]. Care instructions adapted under license by "[employer] (which disclaims liability or warranty for this information). If you have questions about a medical condition or this instruction, always ask your healthcare professional. [place] [address] any warranty or liability for your use of this information.

## 2019-12-31 NOTE — PROGRESS NOTES
[facility]    CC: Swollen Glands    HPI:     Swollen Glands:  - Timing/onset: Noticed it 2 days ago  - Duration: Constant  - Location: Behind left ear  - Progression/Course: Unchanged  - Associated Symptoms/signs: None      ROS: Positive items marked in RED  CON: fever, chills  Cardiovascular: palpitations, CP  Resp: SOB, cough  GI: nausea, vomiting, diarrhea  : dysuria, hematuria      Past Medical History:   Diagnosis Date    Contact dermatitis and other eczema, due to unspecified cause     Depression     Headache(784.0)     Lymphadenopathy 05-    Psychotic disorder ([facility] [address]     Pure hypercholesterolemia 8/10/2017    Schizophrenia ([facility]        History reviewed. No pertinent surgical history. Family History   Problem Relation Age of Onset    Cancer Mother         Nonhodgkins lymphoma    Diabetes Father     Hypertension Father        Social History     Socioeconomic History    Marital status: SINGLE     Spouse name: Not on file    Number of children: Not on file    Years of education: Not on file    Highest education level: Not on file   Tobacco Use    Smoking status: Never Smoker    Smokeless tobacco: Never Used   Substance and Sexual Activity    Alcohol use: No     Alcohol/week: 0.0 standard drinks    Drug use: No    Sexual activity: Never     Partners: Male     Birth control/protection: None       No Known Allergies      Current Outpatient Medications:     ARIPiprazole (ABILIFY) 20 mg tablet, Take 1 Tab by mouth daily. , Disp: 30 Tab, Rfl: 0    sertraline (ZOLOFT) 100 mg tablet, Take 1 Tab by mouth daily. , Disp: 30 Tab, Rfl: 0    multivitamin (ONE A DAY) tablet, Take 1 Tab by mouth daily.   , Disp: , Rfl:     traZODone (DESYREL) 100 mg tablet, Take 1 Tab by mouth nightly., Disp: 30 Tab, Rfl: 1    Physical Exam:      /69   Pulse 86   Temp 98.2 °F (36.8 °C) (Oral)   Resp 16   Ht 5' 2\" (1.575 m)   Wt 245 lb (111.1 kg)   LMP 12/11/2019   SpO2 97%   BMI 44.81 kg/m²     General: Obese habitus, NAD, conversant  Eyes: sclera clear bilaterally, no discharge noted, eyelids normal in appearance  HENT: NCAT  Neck: supple, left occipital lymph node non tender and mildly/minimally enlarged.   Lungs: CTAB, normal respiratory effort and rate  CV: RRR, no MRGs  ABD: soft, non-tender, non-distended, normal bowel sounds  Skin: normal temperature, turgor, color, and texture  Psych: alert and oriented to person, place and situation, normal affect  Neuro: speech normal, moving all extremities, gait normal      Assessment/Plan     Mildly Enlarged Lymph Node:  -Counseled on diagnosis and likely benign etiology  -Handout given on swollen lymph nodes  -Follow-up as needed symptoms worsen or fail to improve        Kristopher Hirsch MD  12/18/2019